# Patient Record
Sex: MALE | Race: BLACK OR AFRICAN AMERICAN | URBAN - METROPOLITAN AREA
[De-identification: names, ages, dates, MRNs, and addresses within clinical notes are randomized per-mention and may not be internally consistent; named-entity substitution may affect disease eponyms.]

---

## 2017-03-16 RX ORDER — INSULIN GLARGINE 100 [IU]/ML
INJECTION, SOLUTION SUBCUTANEOUS
Qty: 15 EACH | Refills: 0 | Status: SHIPPED | OUTPATIENT
Start: 2017-03-16 | End: 2017-08-24 | Stop reason: SDUPTHER

## 2017-08-27 RX ORDER — INSULIN GLARGINE 100 [IU]/ML
INJECTION, SOLUTION SUBCUTANEOUS
Qty: 5 PEN | Refills: 0 | Status: SHIPPED | OUTPATIENT
Start: 2017-08-27 | End: 2017-09-01 | Stop reason: SDUPTHER

## 2017-09-01 RX ORDER — INSULIN GLARGINE 100 [IU]/ML
INJECTION, SOLUTION SUBCUTANEOUS
Qty: 5 PEN | Refills: 0 | Status: SHIPPED | OUTPATIENT
Start: 2017-09-01 | End: 2018-04-02

## 2017-09-23 RX ORDER — METFORMIN HYDROCHLORIDE 1000 MG/1
TABLET ORAL
Qty: 60 TAB | Refills: 3 | Status: SHIPPED | OUTPATIENT
Start: 2017-09-23 | End: 2018-04-02 | Stop reason: SDUPTHER

## 2018-01-31 DIAGNOSIS — E11.9 TYPE 2 DIABETES MELLITUS WITHOUT COMPLICATION, WITH LONG-TERM CURRENT USE OF INSULIN (HCC): ICD-10-CM

## 2018-01-31 DIAGNOSIS — Z79.4 TYPE 2 DIABETES MELLITUS WITHOUT COMPLICATION, WITH LONG-TERM CURRENT USE OF INSULIN (HCC): ICD-10-CM

## 2018-01-31 RX ORDER — METFORMIN HYDROCHLORIDE 1000 MG/1
1000 TABLET ORAL 2 TIMES DAILY WITH MEALS
Qty: 360 TAB | Refills: 3 | Status: SHIPPED | OUTPATIENT
Start: 2018-01-31 | End: 2018-04-02 | Stop reason: SDUPTHER

## 2018-01-31 NOTE — TELEPHONE ENCOUNTER
Requested Prescriptions     Pending Prescriptions Disp Refills    metFORMIN (GLUCOPHAGE) 1,000 mg tablet 360 Tab 3     Sig: Take 1 Tab by mouth two (2) times daily (with meals). PCP: Vimal Leyva MD    Last appt: Visit date not found  No future appointments. Requested Prescriptions     Pending Prescriptions Disp Refills    metFORMIN (GLUCOPHAGE) 1,000 mg tablet 360 Tab 3     Sig: Take 1 Tab by mouth two (2) times daily (with meals).

## 2018-03-05 ENCOUNTER — TELEPHONE (OUTPATIENT)
Dept: INTERNAL MEDICINE CLINIC | Age: 43
End: 2018-03-05

## 2018-03-05 RX ORDER — TADALAFIL 5 MG/1
5 TABLET ORAL
Qty: 30 TAB | Refills: 1 | Status: SHIPPED | OUTPATIENT
Start: 2018-03-05 | End: 2018-04-02

## 2018-03-05 NOTE — TELEPHONE ENCOUNTER
Patient last seen January 2016 states he needs a call back to get an appt today for issues/concerns with his sex drive or if something can be prescribed. Patient advised may be required to be seen first. Please call.  Thank you

## 2018-03-05 NOTE — TELEPHONE ENCOUNTER
Spoke with patient after 2 patient identifiers being note and advised per Dr. Deidre Burns that Kierstenlis had been sent to the pharmacy for  . Patient expressed understanding and has no further questions at this time.

## 2018-04-01 NOTE — PROGRESS NOTES
HISTORY OF PRESENT ILLNESS  Claudio Chen is a 36 y.o. male. HPI   F/u DM-2 dyslipidemia  Last a1c 14.6 2 years ago  -Jan 2016  Today a1c is 8.8  Am fsbs 150-180 every other day  occassional < 100  Skips lantus 1-2 days per week   Can only afford one lantus pen at a time  C/o ED sxs not improved on 5 mg if cialis    Last visit:  Patient here today for followup of type 2 diabetes and dyslipidemia after long absence  Still having difficulty affording insulin and having to work 2 jobs because of a divorce  States that at least 2 days out of 7 misses his dose of levemir  Only able to afford buy 3 pens  per month because of the cost of this medication  Some symptoms of excessive urination, weight has been relatively stable, has not been compliant with diet because of his busy schedule, has been to diabetic teaching class in the past  Restraints are checked maybe 3 or 4 days a week and average on the 300 range recently  Has not seen an eye doctor in the past year, not taking aspirin, no symptoms of neuropathy in his feet, no chest pain symptoms    Patient Active Problem List    Diagnosis Date Noted    Dyslipidemia 01/18/2016    Diabetes mellitus ([facility] [address] [facility] 09/26/2011     Current Outpatient Prescriptions   Medication Sig Dispense Refill    tadalafil (CIALIS) 5 mg tablet Take 1 Tab by mouth daily as needed. 30 Tab 1    metFORMIN (GLUCOPHAGE) 1,000 mg tablet Take 1 Tab by mouth two (2) times daily (with meals). 360 Tab 3    metFORMIN (GLUCOPHAGE) 1,000 mg tablet TAKE 1 TABLET BY MOUTH TWICE A DAY WITH A MEAL 60 Tab 3    insulin glargine (LANTUS SOLOSTAR) 100 unit/mL (3 mL) inpn INJECT 27 UNITS SUBCUTANEOUSLY EVERY DAY 5 Pen 0    LANTUS SOLOSTAR 100 unit/mL (3 mL) inpn INJECT 27 UNITS SUBCUTANEOUSLY EVERY DAY 15 Units 11    FREESTYLE LANCETS 28 gauge misc TEST BEFORE MEALS AND AT BEDTIME 100 Lancet 11    Aspirin, Buffered 81 mg tab Take  by mouth.       Insulin Needles, Disposable, 31 gauge x 5/16\" ndle by SubCUTAneous route daily. Use every day with lantus pen 1 Package 11    glucose blood VI test strips (FREESTYLE LITE STRIPS) strip Test ac & hs 1 Package 11    Blood-Glucose Meter monitoring kit by Does Not Apply route. Test acbd 1 Kit 0     No Known Allergies  Social History   Substance Use Topics    Smoking status: Never Smoker    Smokeless tobacco: Never Used    Alcohol use 2.5 oz/week     5 Cans of beer per week      Lab Results  Component Value Date/Time   Glucose 306 (H) 07/01/2014 10:48 AM   Glucose  11/23/2011 10:54 AM   Microalb/Creat ratio (ug/mg creat.) 11.1 07/01/2014 10:48 AM   LDL, calculated 91 09/26/2011 11:00 AM   Creatinine 1.02 07/01/2014 10:48 AM      Lab Results  Component Value Date/Time   Cholesterol, total 197 09/26/2011 11:00 AM   Cholesterol (POC) 199 07/01/2014 04:21 PM   HDL Cholesterol 32 (L) 09/26/2011 11:00 AM   LDL, calculated 91 09/26/2011 11:00 AM   LDL Cholesterol (POC) 121 07/01/2014 04:21 PM   Triglyceride 368 (H) 09/26/2011 11:00 AM   Triglycerides (POC) 171 07/01/2014 04:21 PM     Lab Results  Component Value Date/Time   GFR est non-AA 92 07/01/2014 10:48 AM   GFR est  07/01/2014 10:48 AM   Creatinine 1.02 07/01/2014 10:48 AM   BUN 11 07/01/2014 10:48 AM   Sodium 137 07/01/2014 10:48 AM   Potassium 4.2 07/01/2014 10:48 AM   Chloride 98 07/01/2014 10:48 AM   CO2 23 07/01/2014 10:48 AM        ROS    Physical Exam   Constitutional: He appears well-developed and well-nourished. No distress. Appears stated age   HENT:   Head: Normocephalic. Cardiovascular: Normal rate, regular rhythm and normal heart sounds. Exam reveals no gallop and no friction rub. No murmur heard. Pulmonary/Chest: Effort normal and breath sounds normal. No respiratory distress. He has no wheezes. He has no rales. He exhibits no tenderness. Abdominal: Soft. Musculoskeletal: He exhibits no edema. Neurological: He is alert. Psychiatric: He has a normal mood and affect.    Nursing note and vitals reviewed. ASSESSMENT and PLAN  Diagnoses and all orders for this visit:    1. Uncontrolled type 2 diabetes mellitus without complication, with long-term current use of insulin (HCC)  -     METABOLIC PANEL, COMPREHENSIVE  -     MICROALBUMIN, UR, RAND W/ MICROALB/CREAT RATIO  -     AMB POC HEMOGLOBIN A1C 8.8  -     REFERRAL TO OPHTHALMOLOGY   Change Lantus to vial/syringes to lower the cost   appt today with CDE in office for diet and insulin instruction    2. Dyslipidemia  -     METABOLIC PANEL, COMPREHENSIVE  -     LIPID PANEL   Probably needs statin    3. Elevated systolic blood pressure reading without diagnosis of hypertension   low sodium diet   Recheck 3 mos   May need to start medication  4. Erectile dysfunction, unspecified erectile dysfunction type   cialis 20 mg tab  Other orders  -     metFORMIN (GLUCOPHAGE) 1,000 mg tablet; TAKE 1 TABLET BY MOUTH TWICE A DAY WITH A MEAL  -     insulin glargine (LANTUS) 100 unit/mL injection; 27 units sc qd  -     Insulin Syringes, Disposable, 1 mL syrg; 1 Each by Does Not Apply route daily. -     tadalafil (CIALIS) 20 mg tablet; Take 1 Tab by mouth as needed. Follow-up Disposition:  Return in about 3 months (around 7/2/2018) for dm-2 elevated bp .

## 2018-04-02 ENCOUNTER — PATIENT OUTREACH (OUTPATIENT)
Dept: INTERNAL MEDICINE CLINIC | Age: 43
End: 2018-04-02

## 2018-04-02 ENCOUNTER — TELEPHONE (OUTPATIENT)
Dept: INTERNAL MEDICINE CLINIC | Age: 43
End: 2018-04-02

## 2018-04-02 ENCOUNTER — OFFICE VISIT (OUTPATIENT)
Dept: INTERNAL MEDICINE CLINIC | Age: 43
End: 2018-04-02

## 2018-04-02 VITALS
SYSTOLIC BLOOD PRESSURE: 141 MMHG | OXYGEN SATURATION: 100 % | HEART RATE: 83 BPM | DIASTOLIC BLOOD PRESSURE: 89 MMHG | HEIGHT: 73 IN | TEMPERATURE: 98.2 F | BODY MASS INDEX: 30.35 KG/M2 | WEIGHT: 229 LBS

## 2018-04-02 DIAGNOSIS — N52.9 ERECTILE DYSFUNCTION, UNSPECIFIED ERECTILE DYSFUNCTION TYPE: ICD-10-CM

## 2018-04-02 DIAGNOSIS — R03.0 ELEVATED SYSTOLIC BLOOD PRESSURE READING WITHOUT DIAGNOSIS OF HYPERTENSION: ICD-10-CM

## 2018-04-02 DIAGNOSIS — E78.5 DYSLIPIDEMIA: ICD-10-CM

## 2018-04-02 LAB — HBA1C MFR BLD HPLC: 8.8 % (ref 4.8–5.6)

## 2018-04-02 RX ORDER — SYRINGE-NEEDLE,INSULIN,0.5 ML 30 GX5/16"
1 SYRINGE, EMPTY DISPOSABLE MISCELLANEOUS DAILY
Qty: 100 SYRINGE | Refills: 3 | Status: SHIPPED | OUTPATIENT
Start: 2018-04-02

## 2018-04-02 RX ORDER — TADALAFIL 20 MG/1
20 TABLET ORAL AS NEEDED
Qty: 6 TAB | Refills: 6 | Status: SHIPPED | OUTPATIENT
Start: 2018-04-02 | End: 2018-11-26 | Stop reason: SDUPTHER

## 2018-04-02 RX ORDER — PEN NEEDLE, DIABETIC 30 GX3/16"
NEEDLE, DISPOSABLE MISCELLANEOUS DAILY
Qty: 1 PACKAGE | Refills: 11 | Status: SHIPPED | OUTPATIENT
Start: 2018-04-02 | End: 2018-04-11 | Stop reason: SDUPTHER

## 2018-04-02 RX ORDER — METFORMIN HYDROCHLORIDE 1000 MG/1
TABLET ORAL
Qty: 60 TAB | Refills: 11 | Status: SHIPPED | OUTPATIENT
Start: 2018-04-02 | End: 2018-05-08 | Stop reason: SDUPTHER

## 2018-04-02 RX ORDER — INSULIN GLARGINE 100 [IU]/ML
INJECTION, SOLUTION SUBCUTANEOUS
Qty: 3 VIAL | Refills: 6 | Status: SHIPPED | OUTPATIENT
Start: 2018-04-02 | End: 2019-06-24 | Stop reason: SDUPTHER

## 2018-04-02 NOTE — MR AVS SNAPSHOT
[address] [facility] [address] [place] [address] 
[phone] Patient: Lorie Duarte MRN: [id number] Visit Information Date & Time Provider Department Dept. Phone Encounter #  
 4/2/2018  9:00 AM Cindi Luis, [address] [place] [address],[address] [phone] [id number] Follow-up Instructions Return in about 3 months (around 7/2/2018) for dm-2 elevated bp . Upcoming Health Maintenance Date Due  
 EYE EXAM RETINAL OR DILATED Q1 10/5/1984 DTaP/Tdap/Td series (1 - Tdap) 10/5/1995 FOOT EXAM Q1 7/1/2015 MICROALBUMIN Q1 7/1/2015 LIPID PANEL Q1 7/1/2015 HEMOGLOBIN A1C Q6M 7/19/2016 Allergies as of 4/2/2018  Review Complete On: 4/2/2018 By: Curtis Nichols LPN No Known Allergies Current Immunizations  Never Reviewed No immunizations on file. Not reviewed this visit You Were Diagnosed With   
  
 Codes Comments Uncontrolled type 2 diabetes mellitus without complication, with long-term current use of insulin [facility]    -  Primary ICD-10-CM: E11.65, Z79.4 ICD-9-CM: 250.02, V58.67 Dyslipidemia     ICD-10-CM: E78.5 ICD-9-CM: 272.4 Elevated systolic blood pressure reading without diagnosis of hypertension     ICD-10-CM: R03.0 ICD-9-CM: 796.2 Erectile dysfunction, unspecified erectile dysfunction type     ICD-10-CM: N52.9 ICD-9-CM: 607.84 Vitals BP Pulse Temp Height(growth percentile) Weight(growth percentile) SpO2  
 141/89 (BP 1 Location: Left arm, BP Patient Position: Sitting) 83 98.2 °F (36.8 °C) (Oral) 6' 1\" (1.854 m) 229 lb (103.9 kg) 100% BMI Smoking Status 30.21 kg/m2 Never Smoker BMI and BSA Data Body Mass Index Body Surface Area  
 30.21 kg/m 2 2.31 m 2 Preferred Pharmacy Pharmacy Name Phone [id number] [place], [address] [phone] Your Updated Medication List  
  
   
This list is accurate as of 18  9:59 AM.  Always use your most recent med list.  
  
  
  
  
 aspirin, buffered 81 mg Tab Take  by mouth. Blood-Glucose Meter monitoring kit  
by Does Not Apply route. Test acbd FREESTYLE LANCETS 28 gauge Misc Generic drug:  lancets TEST BEFORE MEALS AND AT BEDTIME  
  
 glucose blood VI test strips strip Commonly known as:  FREESTYLE LITE STRIPS Test ac & hs  
  
 insulin glargine 100 unit/mL injection Commonly known as:  LANTUS  
27 units sc qd Insulin Needles (Disposable) 31 gauge x 5/16\" Ndle  
by SubCUTAneous route daily. Use every day with lantus pen Insulin Syringes (Disposable) 1 mL Syrg 1 Each by Does Not Apply route daily. metFORMIN 1,000 mg tablet Commonly known as:  GLUCOPHAGE  
TAKE 1 TABLET BY MOUTH TWICE A DAY WITH A MEAL  
  
 tadalafil 20 mg tablet Commonly known as:  CIALIS Take 1 Tab by mouth as needed. Prescriptions Sent to Pharmacy Refills  
 metFORMIN (GLUCOPHAGE) 1,000 mg tablet 11 Sig: TAKE 1 TABLET BY MOUTH TWICE A DAY WITH A MEAL Class: Normal  
 Pharmacy: [facility] [place] [address], [address] Ph #: [phone]  
 insulin glargine (LANTUS) 100 unit/mL injection 6 Si units sc qd Class: Normal  
 Pharmacy: [address] [place] [address], [address] Ph #: [phone] Insulin Syringes, Disposable, 1 mL syrg 6 Si Each by Does Not Apply route daily. Class: Normal  
 Pharmacy: [address] [place] [address], [address] Ph #: [phone] Route: Does Not Apply  
 tadalafil (CIALIS) 20 mg tablet 6 Sig: Take 1 Tab by mouth as needed.   
 Class: Normal  
 Pharmacy: [address] [place], [place] [address]Latisha Abdullahi [address]  #: [phone] Route: Oral  
  
We Performed the Following AMB POC HEMOGLOBIN A1C [90325 CPT(R)] LIPID PANEL [44742 CPT(R)] METABOLIC PANEL, COMPREHENSIVE [03967 CPT(R)] MICROALBUMIN, UR, RAND W/ MICROALB/CREAT RATIO A059779 CPT(R)] REFERRAL TO OPHTHALMOLOGY [REF57 Custom] Follow-up Instructions Return in about 3 months (around 7/2/2018) for dm-2 elevated bp . Referral Information Referral ID Referred By Referred To  
  
 [id number] Landon BESS [address] [place] [address] [address] [place], [id number] [place] [address] Visits Status Start Date End Date 1 New Request 4/2/18 4/2/19 If your referral has a status of pending review or denied, additional information will be sent to support the outcome of this decision. Introducing [facility]! [facility] introduces [employer] patient portal. Now you can access parts of your medical record, email your doctor's office, and request medication refills online. 1. In your internet browser, go to [email or web address] [email or web address] 2. Click on the First Time User? Click Here link in the Sign In box. You will see the New Member Sign Up page. 3. Enter your [employer] Access Code exactly as it appears below. You will not need to use this code after youve completed the sign-up process. If you do not sign up before the expiration date, you must request a new code. · [employer] Access Code: [email or web address] Expires: 7/1/2018  9:59 AM 
 
4. Enter the last four digits of your Social Security Number (xxxx) and Date of Birth (mm/dd/yyyy) as indicated and click Submit. You will be taken to the next sign-up page. 5. Create a [employer] ID. This will be your [employer] login ID and cannot be changed, so think of one that is secure and easy to remember. 6. Create a [employer] password. You can change your password at any time. 7. Enter your Password Reset Question and Answer. This can be used at a later time if you forget your password. 8. Enter your e-mail address. You will receive e-mail notification when new information is available in [address] 9. Click Sign Up. You can now view and download portions of your medical record. 10. Click the Download Summary menu link to download a portable copy of your medical information. If you have questions, please visit the Frequently Asked Questions section of the [employer] website. Remember, [employer] is NOT to be used for urgent needs. For medical emergencies, dial 911. Now available from your iPhone and Android! Please provide this summary of care documentation to your next provider. Your primary care clinician is listed as Kate BESS. If you have any questions after today's visit, please call [phone].

## 2018-04-02 NOTE — PROGRESS NOTES
Was asked by Dr. Daria Greenfield to see pt today during office visit for Lantus vial and syringe administration instructions and diet. Pt has been taking insulin via the Lantus pen. Pt has trouble affording Lantus pen. Pt's A1c was 8.8% in office today. Pt has taken his insulin this morning. Pt was instructed how to inject insulin using a vial and syringe. Pt correctly return demonstrated using the injection pad model. Pt was given 10 sample B-D 1/2 mL (50 unit) syringes. Pt was given the Lantus vial and syringe step by step instructions to take home. Pt will call with any questions or concerns. Pt attended diabetes education when first diagnosed about 2 years ago. Pt has pretty good understanding of what a carbohydrate is, but does drink sugar sweetened tea. Pt states he does have some occasional numbness and tingling in his feet. Pt was educated on the healthy plate meal planning method. Gave pt the American Diabetes Association - Living With Type 2 Diabetes - Where Do I Begin? handout. Pt was instructed to schedule 3 month f/u with  today before he leaves. Pt will call this diabetes educator with any questions or concerns.      Juan M Sawyer, RN, CCM, CDE

## 2018-04-02 NOTE — TELEPHONE ENCOUNTER
Spoke with patient after 2 patient identifiers being note and advised that pharmacy reports that inulin and Cialis are there and ready for . Patient expressed understanding and has no further questions at this time.

## 2018-04-02 NOTE — TELEPHONE ENCOUNTER
Patient states [facility] on file needs a call back to get clarification on patient's Lantus & Cialis prescriptions. Please call pharmacy & then patient.  Thank you

## 2018-04-02 NOTE — PROGRESS NOTES
Chief Complaint   Patient presents with    Diabetes     POC A1C 8.8%    Hypertension    Medication Evaluation

## 2018-04-03 LAB
ALBUMIN SERPL-MCNC: 4.6 G/DL (ref 3.5–5.5)
ALBUMIN/CREAT UR: 19.1 MG/G CREAT (ref 0–30)
ALBUMIN/GLOB SERPL: 1.7 {RATIO} (ref 1.2–2.2)
ALP SERPL-CCNC: 91 IU/L (ref 39–117)
ALT SERPL-CCNC: 23 IU/L (ref 0–44)
AST SERPL-CCNC: 19 IU/L (ref 0–40)
BILIRUB SERPL-MCNC: 0.5 MG/DL (ref 0–1.2)
BUN SERPL-MCNC: 11 MG/DL (ref 6–24)
BUN/CREAT SERPL: 12 (ref 9–20)
CALCIUM SERPL-MCNC: 9.5 MG/DL (ref 8.7–10.2)
CHLORIDE SERPL-SCNC: 106 MMOL/L (ref 96–106)
CHOLEST SERPL-MCNC: 164 MG/DL (ref 100–199)
CO2 SERPL-SCNC: 18 MMOL/L (ref 18–29)
CREAT SERPL-MCNC: 0.93 MG/DL (ref 0.76–1.27)
CREAT UR-MCNC: 221.6 MG/DL
GFR SERPLBLD CREATININE-BSD FMLA CKD-EPI: 100 ML/MIN/1.73
GFR SERPLBLD CREATININE-BSD FMLA CKD-EPI: 116 ML/MIN/1.73
GLOBULIN SER CALC-MCNC: 2.7 G/DL (ref 1.5–4.5)
GLUCOSE SERPL-MCNC: 222 MG/DL (ref 65–99)
HDLC SERPL-MCNC: 61 MG/DL
LDLC SERPL CALC-MCNC: 89 MG/DL (ref 0–99)
MICROALBUMIN UR-MCNC: 42.4 UG/ML
POTASSIUM SERPL-SCNC: 4 MMOL/L (ref 3.5–5.2)
PROT SERPL-MCNC: 7.3 G/DL (ref 6–8.5)
SODIUM SERPL-SCNC: 147 MMOL/L (ref 134–144)
TRIGL SERPL-MCNC: 71 MG/DL (ref 0–149)
VLDLC SERPL CALC-MCNC: 14 MG/DL (ref 5–40)

## 2018-04-06 ENCOUNTER — TELEPHONE (OUTPATIENT)
Dept: INTERNAL MEDICINE CLINIC | Age: 43
End: 2018-04-06

## 2018-04-06 NOTE — TELEPHONE ENCOUNTER
#[phone] pt states the medication that had dosage change needs to have a prior auth through insurance co. This is the Cialis. Please call them.

## 2018-04-09 ENCOUNTER — TELEPHONE (OUTPATIENT)
Dept: INTERNAL MEDICINE CLINIC | Age: 43
End: 2018-04-09

## 2018-04-09 NOTE — TELEPHONE ENCOUNTER
The pt called because his pharmacy hasn't received a response for his \"cialis\" medication yet.        Best contact number is [phone].          Message received & copied from [facility] after closing on 4/6/18

## 2018-04-09 NOTE — TELEPHONE ENCOUNTER
#[phone] [facility] states that the PA has gone through, but they are still requesting the approval.  Please fax this to #[phone] thanks.

## 2018-04-09 NOTE — TELEPHONE ENCOUNTER
Per [employer] PA was already submitted for this patient and drug which was approved. ;PWMGAC:[id number];JNZABS:[id number]; Coverage Start [id number]; Coverage End [id number]. Patient & pharmacy notified.

## 2018-04-10 NOTE — TELEPHONE ENCOUNTER
Pt is requesting  to contact the pharmacy, because they need the approval the the prescription. Best contact number is [phone].        Message received & copied from [facility] after closing on 4/9/18

## 2018-04-10 NOTE — TELEPHONE ENCOUNTER
#[phone] pt states that insurance states his script has been approved and yet he can't get this filled. Pt wants to know why? This is for the Cialis. Please call pt so he knows what is going on.

## 2018-04-11 DIAGNOSIS — E11.9 TYPE 2 DIABETES MELLITUS WITHOUT COMPLICATION, WITH LONG-TERM CURRENT USE OF INSULIN (HCC): Primary | ICD-10-CM

## 2018-04-11 DIAGNOSIS — Z79.4 TYPE 2 DIABETES MELLITUS WITHOUT COMPLICATION, WITH LONG-TERM CURRENT USE OF INSULIN (HCC): Primary | ICD-10-CM

## 2018-04-11 RX ORDER — PEN NEEDLE, DIABETIC 30 GX3/16"
NEEDLE, DISPOSABLE MISCELLANEOUS DAILY
Qty: 3 PACKAGE | Refills: 3 | Status: SHIPPED | OUTPATIENT
Start: 2018-04-11 | End: 2019-08-09 | Stop reason: SDUPTHER

## 2018-04-11 NOTE — TELEPHONE ENCOUNTER
Requested Prescriptions     Pending Prescriptions Disp Refills    Insulin Needles, Disposable, 31 gauge x 5/16\" ndle 3 Package 3     Sig: by SubCUTAneous route daily. Use every day with lantus pen      PCP: Sloane Coffman MD    Last appt: 4/2/2018  No future appointments. Requested Prescriptions     Pending Prescriptions Disp Refills    Insulin Needles, Disposable, 31 gauge x 5/16\" ndle 3 Package 3     Sig: by SubCUTAneous route daily.  Use every day with lantus pen

## 2018-04-12 ENCOUNTER — TELEPHONE (OUTPATIENT)
Dept: INTERNAL MEDICINE CLINIC | Age: 43
End: 2018-04-12

## 2018-04-12 NOTE — TELEPHONE ENCOUNTER
Pt is requesting a call to the insurance company for prior authorization on the \"Cialis 20 mg.\" Pt contact [phone].        Message received & copied from [facility]

## 2018-04-23 RX ORDER — INSULIN GLARGINE 100 [IU]/ML
INJECTION, SOLUTION SUBCUTANEOUS
Qty: 15 ADJUSTABLE DOSE PRE-FILLED PEN SYRINGE | Refills: 6 | Status: SHIPPED | OUTPATIENT
Start: 2018-04-23 | End: 2018-05-08 | Stop reason: SDUPTHER

## 2018-05-08 ENCOUNTER — TELEPHONE (OUTPATIENT)
Dept: INTERNAL MEDICINE CLINIC | Age: 43
End: 2018-05-08

## 2018-05-08 DIAGNOSIS — E11.9 TYPE 2 DIABETES MELLITUS WITHOUT COMPLICATION, WITH LONG-TERM CURRENT USE OF INSULIN (HCC): Primary | ICD-10-CM

## 2018-05-08 DIAGNOSIS — Z79.4 TYPE 2 DIABETES MELLITUS WITHOUT COMPLICATION, WITH LONG-TERM CURRENT USE OF INSULIN (HCC): Primary | ICD-10-CM

## 2018-05-08 DIAGNOSIS — E11.9 TYPE 2 DIABETES MELLITUS WITHOUT COMPLICATION, WITH LONG-TERM CURRENT USE OF INSULIN (HCC): ICD-10-CM

## 2018-05-08 DIAGNOSIS — Z79.4 TYPE 2 DIABETES MELLITUS WITHOUT COMPLICATION, WITH LONG-TERM CURRENT USE OF INSULIN (HCC): ICD-10-CM

## 2018-05-08 RX ORDER — SYRINGE-NEEDLE,INSULIN,0.5 ML 30 GX5/16"
1 SYRINGE, EMPTY DISPOSABLE MISCELLANEOUS DAILY
Qty: 100 SYRINGE | Refills: 3 | Status: CANCELLED | OUTPATIENT
Start: 2018-05-08

## 2018-05-08 RX ORDER — METFORMIN HYDROCHLORIDE 1000 MG/1
TABLET ORAL
Qty: 180 TAB | Refills: 3 | Status: SHIPPED | OUTPATIENT
Start: 2018-05-08 | End: 2019-06-24 | Stop reason: SDUPTHER

## 2018-05-08 RX ORDER — INSULIN GLARGINE 100 [IU]/ML
INJECTION, SOLUTION SUBCUTANEOUS
Qty: 15 ADJUSTABLE DOSE PRE-FILLED PEN SYRINGE | Refills: 6 | Status: SHIPPED | OUTPATIENT
Start: 2018-05-08 | End: 2019-06-24 | Stop reason: SDUPTHER

## 2018-05-08 NOTE — TELEPHONE ENCOUNTER
Requested Prescriptions     Pending Prescriptions Disp Refills    metFORMIN (GLUCOPHAGE) 1,000 mg tablet 180 Tab 3     Sig: TAKE 1 TABLET BY MOUTH TWICE A DAY WITH A MEAL       PCP: Tyrese Rodriguez MD    Last appt: 4/2/2018  No future appointments.     Requested Prescriptions     Pending Prescriptions Disp Refills    metFORMIN (GLUCOPHAGE) 1,000 mg tablet 180 Tab 3     Sig: TAKE 1 TABLET BY MOUTH TWICE A DAY WITH A MEAL

## 2018-05-08 NOTE — TELEPHONE ENCOUNTER
Requested Prescriptions     Pending Prescriptions Disp Refills    insulin glargine (LANTUS SOLOSTAR U-100 INSULIN) 100 unit/mL (3 mL) inpn 15 Adjustable Dose Pre-filled Pen Syringe 6

## 2018-11-23 ENCOUNTER — TELEPHONE (OUTPATIENT)
Dept: INTERNAL MEDICINE CLINIC | Age: 43
End: 2018-11-23

## 2018-11-23 NOTE — TELEPHONE ENCOUNTER
Unable to reach patient LVM to return call.  Want to schedule patient an appt to get lantus insulin and symbicort looked at per Dr. Villeda Bud

## 2018-11-26 DIAGNOSIS — N52.8 OTHER MALE ERECTILE DYSFUNCTION: Primary | ICD-10-CM

## 2018-11-26 RX ORDER — TADALAFIL 10 MG/1
10 TABLET ORAL AS NEEDED
Qty: 30 TAB | Refills: 1 | Status: SHIPPED | OUTPATIENT
Start: 2018-11-26 | End: 2019-12-30

## 2018-11-26 NOTE — TELEPHONE ENCOUNTER
Pt requesting a refill for \"Cialas 5 mg\". He stated he would like 10 mg instead if he could get that because 5 mg did not really work. [facility] pharmacy on file. Best contact [phone].       Copy/paste Envera

## 2018-12-14 ENCOUNTER — TELEPHONE (OUTPATIENT)
Dept: INTERNAL MEDICINE CLINIC | Age: 43
End: 2018-12-14

## 2018-12-14 NOTE — TELEPHONE ENCOUNTER
Pt requesting an authorization to be done on prescription: \"cialis 10 mg\". [place] [phone]. Pt best contact number is [phone].        Message received & copied from [facility]

## 2019-05-10 RX ORDER — INSULIN GLARGINE 100 [IU]/ML
INJECTION, SOLUTION SUBCUTANEOUS
Qty: 15 ADJUSTABLE DOSE PRE-FILLED PEN SYRINGE | Refills: 3 | Status: SHIPPED | OUTPATIENT
Start: 2019-05-10 | End: 2019-06-24 | Stop reason: SDUPTHER

## 2019-05-22 ENCOUNTER — TELEPHONE (OUTPATIENT)
Dept: INTERNAL MEDICINE CLINIC | Age: 44
End: 2019-05-22

## 2019-05-22 NOTE — TELEPHONE ENCOUNTER
Spoke with patient using two identifiers name and date of birth. Patient was informed that he needed to make an appointment before his next medication  refills. Patient is good for now on his medications. Patient will call back to make  an appointment. Patient verbalized understanding.

## 2019-05-23 ENCOUNTER — TELEPHONE (OUTPATIENT)
Dept: INTERNAL MEDICINE CLINIC | Age: 44
End: 2019-05-23

## 2019-05-23 NOTE — TELEPHONE ENCOUNTER
Called, left vm for pt to return call to office. Notified pt via voicemail, insulin sent to pharmacy on 5/10.

## 2019-05-23 NOTE — TELEPHONE ENCOUNTER
----- Message from Sherren Peal sent at 5/23/2019  9:10 AM EDT -----  Regarding: Dr. Allie Ritter  Patient scheduled an appointment with Dr. Aris Moffett for 6/17/19 at 1:30pm to renew prescription for insulin. Patient is requesting that a temporary approval be given so he will not run out prior to his upcoming appointment. Pharmacy is [facility] on [address] [address]. Best contact number for patient is [phone].

## 2019-06-24 ENCOUNTER — OFFICE VISIT (OUTPATIENT)
Dept: INTERNAL MEDICINE CLINIC | Age: 44
End: 2019-06-24

## 2019-06-24 VITALS
RESPIRATION RATE: 20 BRPM | SYSTOLIC BLOOD PRESSURE: 133 MMHG | TEMPERATURE: 98.2 F | HEIGHT: 73 IN | HEART RATE: 93 BPM | OXYGEN SATURATION: 98 % | BODY MASS INDEX: 32.13 KG/M2 | DIASTOLIC BLOOD PRESSURE: 83 MMHG | WEIGHT: 242.4 LBS

## 2019-06-24 DIAGNOSIS — E11.9 TYPE 2 DIABETES MELLITUS WITHOUT COMPLICATION, WITH LONG-TERM CURRENT USE OF INSULIN (HCC): ICD-10-CM

## 2019-06-24 DIAGNOSIS — Z79.4 TYPE 2 DIABETES MELLITUS WITHOUT COMPLICATION, WITH LONG-TERM CURRENT USE OF INSULIN (HCC): ICD-10-CM

## 2019-06-24 RX ORDER — METFORMIN HYDROCHLORIDE 1000 MG/1
TABLET ORAL
Qty: 180 TAB | Refills: 3 | Status: SHIPPED | OUTPATIENT
Start: 2019-06-24 | End: 2020-04-06 | Stop reason: CLARIF

## 2019-06-24 RX ORDER — INSULIN PUMP SYRINGE, 3 ML
EACH MISCELLANEOUS
Qty: 1 KIT | Refills: 0 | Status: SHIPPED | OUTPATIENT
Start: 2019-06-24

## 2019-06-24 RX ORDER — LANCETS 28 GAUGE
EACH MISCELLANEOUS
Qty: 100 LANCET | Refills: 11 | Status: SHIPPED | OUTPATIENT
Start: 2019-06-24

## 2019-06-24 RX ORDER — INSULIN GLARGINE 100 [IU]/ML
40 INJECTION, SOLUTION SUBCUTANEOUS
Qty: 3 VIAL | Refills: 6 | Status: SHIPPED | OUTPATIENT
Start: 2019-06-24 | End: 2020-03-26 | Stop reason: SDUPTHER

## 2019-06-24 NOTE — PROGRESS NOTES
1. Have you been to the ER, urgent care clinic since your last visit? Hospitalized since your last visit? no    2. Have you seen or consulted any other health care providers outside of the [address] [place] [address] since your last visit? Include any pap smears or colon screening.  no

## 2019-06-24 NOTE — PROGRESS NOTES
SUBJECTIVE:   Mr. Juliet Mora is a 39 y.o. male patient of Katrin Jones MD who is here for follow up of routine medical issues. Chief Complaint   Patient presents with    Medication Refill     pt here today for medication refill and disucuss diabetes      He has been diabetic for about 5 years. He is on lantus and metformin. Fasting gluc 120-130; in the evening low 100s. Occasional tingling in feet. At this time, he is otherwise doing well and has brought no other complaints to my attention today. For a list of the medical issues addressed today, see the assessment and plan below. PMH: Diabetes mellitus. PSH: No surgery. All: He has No Known Allergies. Current Outpatient Medications   Medication Sig    LANTUS SOLOSTAR U-100 INSULIN 100 unit/mL (3 mL) inpn INJECT 27 UNITS SUBCUTANEOUSLY EVERY DAY    tadalafil (CIALIS) 10 mg tablet Take 1 Tab by mouth as needed.  metFORMIN (GLUCOPHAGE) 1,000 mg tablet TAKE 1 TABLET BY MOUTH TWICE A DAY WITH A MEAL    insulin glargine (LANTUS SOLOSTAR U-100 INSULIN) 100 unit/mL (3 mL) inpn INJECT 27 UNITS SUBCUTANEOUSLY EVERY DAY    Insulin Needles, Disposable, 31 gauge x 5/16\" ndle by SubCUTAneous route daily. Use every day with lantus pen    insulin glargine (LANTUS) 100 unit/mL injection 27 units sc qd    Insulin Syringes, Disposable, 1 mL syrg 1 Each by Does Not Apply route daily.  Insulin Syringe-Needle U-100 (INSULIN SYRINGE) 1 mL 30 gauge x 5/16 syrg 1 Syringe by Does Not Apply route daily.  FREESTYLE LANCETS 28 gauge misc TEST BEFORE MEALS AND AT BEDTIME    glucose blood VI test strips (FREESTYLE LITE STRIPS) strip Test ac & hs    Blood-Glucose Meter monitoring kit by Does Not Apply route. Test acbd    Aspirin, Buffered 81 mg tab Take  by mouth. No current facility-administered medications for this visit. FH: His family history is not on file. : Luann Glynn. He reports that he has never smoked.  He has never used smokeless tobacco. He reports that he drinks about 2.5 oz of alcohol per week. He reports that he has current or past drug history. ROS: See above; Complete ROS otherwise negative. OBJECTIVE:   Vitals:   Visit Vitals  /83 (BP 1 Location: Left arm, BP Patient Position: Sitting)   Pulse 93   Temp 98.2 °F (36.8 °C) (Oral)   Resp 20   Ht 6' 1\" (1.854 m)   Wt 242 lb 6.4 oz (110 kg)   SpO2 98%   BMI 31.98 kg/m²      Gen: Pleasant 39 y.o.  male in NAD. HEENT: PERRLA. EOMI. OP moist and pink. Neck: Supple. No LAD. HEART: RRR, No M/G/R.    LUNGS: CTAB No W/R. ABDOMEN: S, NT, ND, BS+. EXTREMITIES: Warm. No C/C/E.  MUSCULOSKELETAL: Normal ROM, muscle strength 5/5 all groups. NEURO: Alert and oriented x 3. Cranial nerves grossly intact. No focal sensory or motor deficits noted. SKIN: Warm. Dry. No rashes or other lesions noted. Lab Results   Component Value Date/Time    Sodium 147 (H) 04/02/2018 10:41 AM    Potassium 4.0 04/02/2018 10:41 AM    Chloride 106 04/02/2018 10:41 AM    CO2 18 04/02/2018 10:41 AM    Glucose 222 (H) 04/02/2018 10:41 AM    BUN 11 04/02/2018 10:41 AM    Creatinine 0.93 04/02/2018 10:41 AM    BUN/Creatinine ratio 12 04/02/2018 10:41 AM    GFR est  04/02/2018 10:41 AM    GFR est non- 04/02/2018 10:41 AM    Calcium 9.5 04/02/2018 10:41 AM    Bilirubin, total 0.5 04/02/2018 10:41 AM    ALT (SGPT) 23 04/02/2018 10:41 AM    AST (SGOT) 19 04/02/2018 10:41 AM    Alk.  phosphatase 91 04/02/2018 10:41 AM    Protein, total 7.3 04/02/2018 10:41 AM    Albumin 4.6 04/02/2018 10:41 AM    A-G Ratio 1.7 04/02/2018 10:41 AM       Lab Results   Component Value Date/Time    Cholesterol, total 164 04/02/2018 10:41 AM    HDL Cholesterol 61 04/02/2018 10:41 AM    LDL, calculated 89 04/02/2018 10:41 AM    Triglyceride 71 04/02/2018 10:41 AM       Lab Results   Component Value Date/Time    WBC 5.1 07/01/2014 10:48 AM    HGB 16.0 07/01/2014 10:48 AM    HCT 47.4 07/01/2014 10:48 AM PLATELET 196 08/66/6249 10:48 AM    MCV 88 07/01/2014 10:48 AM         ASSESSMENT/ PLAN: Diagnoses and all orders for this visit:    1. Type 2 diabetes mellitus without complication, with long-term current use of insulin [facility]): Not controlled at last check. -     insulin glargine (LANTUS) 100 unit/mL injection; 40 Units by SubCUTAneous route nightly. -     metFORMIN (GLUCOPHAGE) 1,000 mg tablet; TAKE 1 TABLET BY MOUTH TWICE A DAY WITH A MEAL  -     Blood-Glucose Meter monitoring kit; Test before meals and at bedtime. DM 2, insulin dependent. Length of need: 99.  -     glucose blood VI test strips (FREESTYLE LITE STRIPS) strip; Test ac & hs  -     lancets (FREESTYLE LANCETS) 28 gauge misc; Test before meals and at bedtime  -     LIPID PANEL  -     HEMOGLOBIN A1C WITH EAG  -     METABOLIC PANEL, COMPREHENSIVE  -     CBC WITH AUTOMATED DIFF  -     MICROALBUMIN, UR, RAND W/ MICROALB/CREAT RATIO    Follow-up and Dispositions    · Return in about 3 months (around 9/24/2019) for DM; Dr. De Leon Pel. I have reviewed the patient's medications and risks/side effects/benefits were discussed. Diagnosis(-es) explained to patient and questions answered. Literature provided where appropriate.

## 2019-08-09 DIAGNOSIS — Z79.4 TYPE 2 DIABETES MELLITUS WITHOUT COMPLICATION, WITH LONG-TERM CURRENT USE OF INSULIN (HCC): ICD-10-CM

## 2019-08-09 DIAGNOSIS — E11.9 TYPE 2 DIABETES MELLITUS WITHOUT COMPLICATION, WITH LONG-TERM CURRENT USE OF INSULIN (HCC): ICD-10-CM

## 2019-08-09 RX ORDER — PEN NEEDLE, DIABETIC 31 GX5/16"
NEEDLE, DISPOSABLE MISCELLANEOUS
Qty: 90 PEN NEEDLE | Refills: 13 | Status: SHIPPED | OUTPATIENT
Start: 2019-08-09 | End: 2021-05-06

## 2019-12-30 DIAGNOSIS — N52.8 OTHER MALE ERECTILE DYSFUNCTION: ICD-10-CM

## 2019-12-30 RX ORDER — TADALAFIL 10 MG/1
TABLET ORAL
Qty: 8 TAB | Refills: 7 | Status: SHIPPED | OUTPATIENT
Start: 2019-12-30 | End: 2020-01-07 | Stop reason: SDUPTHER

## 2020-01-07 DIAGNOSIS — N52.8 OTHER MALE ERECTILE DYSFUNCTION: ICD-10-CM

## 2020-01-07 RX ORDER — TADALAFIL 5 MG/1
TABLET ORAL
Qty: 9 TAB | Refills: 3 | Status: SHIPPED | OUTPATIENT
Start: 2020-01-07 | End: 2021-03-15

## 2020-01-07 NOTE — TELEPHONE ENCOUNTER
Pt states he gets 5 mg of this medication. This needs to go to [facility] for 90 day mail order. Phone #[phone] To call that in.

## 2020-03-26 DIAGNOSIS — Z79.4 TYPE 2 DIABETES MELLITUS WITHOUT COMPLICATION, WITH LONG-TERM CURRENT USE OF INSULIN (HCC): ICD-10-CM

## 2020-03-26 DIAGNOSIS — E11.9 TYPE 2 DIABETES MELLITUS WITHOUT COMPLICATION, WITH LONG-TERM CURRENT USE OF INSULIN (HCC): ICD-10-CM

## 2020-03-26 RX ORDER — INSULIN GLARGINE 100 [IU]/ML
40 INJECTION, SOLUTION SUBCUTANEOUS
Qty: 3 VIAL | Refills: 6 | Status: SHIPPED | OUTPATIENT
Start: 2020-03-26 | End: 2020-04-06 | Stop reason: SDUPTHER

## 2020-03-26 NOTE — TELEPHONE ENCOUNTER
----- Message from Rachel James sent at 3/26/2020  9:20 AM EDT -----  Regarding: /refill  MEDICATION REFILL    To: Gladys Tirado  Subject: Dr. Essie Warren  Patient's first and last name: Anand Ndiaye. : 1975  ID numbers: #[id number]      Caller (if not patient): n/a  Relationship of caller (if not patient): n/a  Best contact number(s): [phone]  Name of medication and dosage if known: \"Lantus Solostar\" insulin pen  Is patient out of this medication (yes/no): yes  Pharmacy name: [facility]  Pharmacy listed in chart? (yes/no): yes  Pharmacy phone number: [phone]  Date of last visit: 2019  Details to clarify the request: Patient has requested a 90 day supply.     [place]      Copy/paste envera

## 2020-04-01 ENCOUNTER — TELEPHONE (OUTPATIENT)
Dept: INTERNAL MEDICINE CLINIC | Age: 45
End: 2020-04-01

## 2020-04-01 NOTE — TELEPHONE ENCOUNTER
Patient is requesting to have a 90 day supply for Lantus Solo star to be sent to [facility] ph: [phone].

## 2020-04-01 NOTE — TELEPHONE ENCOUNTER
Called, spoke to pt. Two identifiers confirmed. VV scheduled for 4/6 @ 10 with Dr. Kenna Calderon. Pt verbalized understanding of information discussed w/ no further questions at this time.

## 2020-04-04 NOTE — PROGRESS NOTES
HISTORY OF PRESENT ILLNESS  Aydin Morse is a 38 y.o. male. HPI     Pursuant to the emergency declaration under the [employer] and [facility] authority and the [employer] Act, this Virtual Visit was conducted, with patient's consent, to reduce the patient's risk of exposure to COVID-19 and provide continuity of care for an established patient. Services were provided through a video synchronous discussion virtually to substitute for in-person appointment. VV for CPE and f/u DM-2 on insulin and dyslipidemia  Last a1c 8.8 and LDL 89    fsbs---- fasting in mornings   not on a strict diet-2 meals per day  Some gi intolerance upset stomach on metformin bid. No cp sob or symptoms of neuropathy  Works at [employer]   -2 children  Not taking aspirin daily  Overdue for labs, eye and foot exam and pneumovax/tdap      Patient Active Problem List    Diagnosis Date Noted    Dyslipidemia 01/18/2016    Diabetes mellitus ([facility] [address] 09/26/2011     Current Outpatient Medications   Medication Sig Dispense Refill    insulin glargine (LANTUS) 100 unit/mL injection 40 Units by SubCUTAneous route nightly. DX E11.9 3 Vial 6    tadalafil (CIALIS) 5 mg tablet TAKE 1 TABLET BY MOUTH EVERY DAY AS NEEDED 9 Tab 3    BD ULTRA-FINE SHORT PEN NEEDLE 31 gauge x 5/16\" ndle BY SUBCUTANEOUS ROUTE DAILY. USE EVERY DAY WITH LANTUS PEN 90 Pen Needle 13    metFORMIN (GLUCOPHAGE) 1,000 mg tablet TAKE 1 TABLET BY MOUTH TWICE A DAY WITH A MEAL 180 Tab 3    Blood-Glucose Meter monitoring kit Test before meals and at bedtime. DM 2, insulin dependent.   Length of need: 99. 1 Kit 0    glucose blood VI test strips (FREESTYLE LITE STRIPS) strip Test ac & hs 100 Strip 3    lancets (FREESTYLE LANCETS) 28 gauge misc Test before meals and at bedtime 100 Lancet 11    Insulin Syringes, Disposable, 1 mL syrg 1 Each by Does Not Apply route daily. 30 Syringe 6    Insulin Syringe-Needle U-100 (INSULIN SYRINGE) 1 mL 30 gauge x 5/16 syrg 1 Syringe by Does Not Apply route daily. 100 Syringe 3    Aspirin, Buffered 81 mg tab Take  by mouth. No Known Allergies  No past medical history on file. Social History     Tobacco Use    Smoking status: Never Smoker    Smokeless tobacco: Never Used   Substance Use Topics    Alcohol use: Yes     Alcohol/week: 4.2 standard drinks     Types: 5 Cans of beer per week      Lab Results   Component Value Date/Time    WBC 5.1 07/01/2014 10:48 AM    HGB 16.0 07/01/2014 10:48 AM    HCT 47.4 07/01/2014 10:48 AM    PLATELET 230 18/05/1168 10:48 AM    MCV 88 07/01/2014 10:48 AM     Lab Results   Component Value Date/Time    Glucose 222 (H) 04/02/2018 10:41 AM    Glucose  11/23/2011 10:54 AM    Microalb/Creat ratio (ug/mg creat.) 19.1 04/02/2018 10:41 AM    LDL, calculated 89 04/02/2018 10:41 AM    Creatinine 0.93 04/02/2018 10:41 AM      Lab Results   Component Value Date/Time    Cholesterol, total 164 04/02/2018 10:41 AM    Cholesterol (POC) 199 07/01/2014 04:21 PM    HDL Cholesterol 61 04/02/2018 10:41 AM    LDL, calculated 89 04/02/2018 10:41 AM    LDL Cholesterol (POC) 121 07/01/2014 04:21 PM    Triglyceride 71 04/02/2018 10:41 AM    Triglycerides (POC) 171 07/01/2014 04:21 PM     Lab Results   Component Value Date/Time    GFR est non- 04/02/2018 10:41 AM    GFR est  04/02/2018 10:41 AM    Creatinine 0.93 04/02/2018 10:41 AM    BUN 11 04/02/2018 10:41 AM    Sodium 147 (H) 04/02/2018 10:41 AM    Potassium 4.0 04/02/2018 10:41 AM    Chloride 106 04/02/2018 10:41 AM    CO2 18 04/02/2018 10:41 AM        Review of Systems   Constitutional: Negative for chills, fever, malaise/fatigue and weight loss. Eyes: Negative for blurred vision and double vision. Respiratory: Negative for cough and shortness of breath. Cardiovascular: Negative for chest pain and palpitations.    Gastrointestinal: Negative for abdominal pain, blood in stool, constipation, diarrhea, melena, nausea and vomiting. Genitourinary: Negative for dysuria, frequency, hematuria and urgency. Musculoskeletal: Negative for back pain, falls, joint pain and myalgias. Neurological: Negative for dizziness, tremors and headaches. Physical Exam  Constitutional:       Appearance: Normal appearance. Pulmonary:      Effort: Pulmonary effort is normal.   Neurological:      Mental Status: He is alert. ASSESSMENT and PLAN  Diagnoses and all orders for this visit:    1. Routine general medical examination at a health care facility  -     CBC W/O DIFF  -     METABOLIC PANEL, COMPREHENSIVE  -     LIPID PANEL  -     TSH 3RD GENERATION  -     PSA W/ REFLX FREE PSA   Advised to get pneumovax and tdap  vaccines  2. Uncontrolled type 2 diabetes mellitus with hyperglycemia (HCC)  -     HEMOGLOBIN A1C WITH EAG  -     MICROALBUMIN, UR, RAND W/ MICROALB/CREAT RATIO   Work on diet   Consider adding weekly glp-1-discussed   Add aspirin 81 mg every day   Refer to [facility] for eye exam   Change meformin in xr d/t gi side effects  3. Dyslipidemia    4. Type 2 diabetes mellitus without complication, with long-term current use of insulin (HCC)  -     glucose blood VI test strips (FreeStyle Lite Strips) strip; Test ac & hs  -     insulin glargine (LANTUS) 100 unit/mL injection; 40 Units by SubCUTAneous route nightly. DX E11.9  -     REFERRAL TO OPHTHALMOLOGY    Other orders  -     metFORMIN ER (GLUCOPHAGE XR) 500 mg tablet; Take 4 Tabs by mouth daily (with dinner).

## 2020-04-06 ENCOUNTER — VIRTUAL VISIT (OUTPATIENT)
Dept: INTERNAL MEDICINE CLINIC | Age: 45
End: 2020-04-06

## 2020-04-06 DIAGNOSIS — Z00.00 ROUTINE GENERAL MEDICAL EXAMINATION AT A HEALTH CARE FACILITY: Primary | ICD-10-CM

## 2020-04-06 DIAGNOSIS — E11.9 TYPE 2 DIABETES MELLITUS WITHOUT COMPLICATION, WITH LONG-TERM CURRENT USE OF INSULIN (HCC): ICD-10-CM

## 2020-04-06 DIAGNOSIS — E78.5 DYSLIPIDEMIA: ICD-10-CM

## 2020-04-06 DIAGNOSIS — E11.65 UNCONTROLLED TYPE 2 DIABETES MELLITUS WITH HYPERGLYCEMIA (HCC): ICD-10-CM

## 2020-04-06 DIAGNOSIS — Z79.4 TYPE 2 DIABETES MELLITUS WITHOUT COMPLICATION, WITH LONG-TERM CURRENT USE OF INSULIN (HCC): ICD-10-CM

## 2020-04-06 RX ORDER — METFORMIN HYDROCHLORIDE 500 MG/1
2000 TABLET, EXTENDED RELEASE ORAL
Qty: 360 TAB | Refills: 3 | Status: SHIPPED | OUTPATIENT
Start: 2020-04-06

## 2020-04-06 RX ORDER — INSULIN GLARGINE 100 [IU]/ML
40 INJECTION, SOLUTION SUBCUTANEOUS
Qty: 12 VIAL | Refills: 3 | Status: SHIPPED | OUTPATIENT
Start: 2020-04-06 | End: 2021-03-15

## 2021-02-16 RX ORDER — TADALAFIL 10 MG/1
TABLET ORAL
Qty: 8 TAB | Refills: 6 | Status: SHIPPED | OUTPATIENT
Start: 2021-02-16 | End: 2022-02-19

## 2021-03-15 ENCOUNTER — VIRTUAL VISIT (OUTPATIENT)
Dept: INTERNAL MEDICINE CLINIC | Age: 46
End: 2021-03-15

## 2021-03-15 DIAGNOSIS — E11.9 TYPE 2 DIABETES MELLITUS WITHOUT COMPLICATION, WITH LONG-TERM CURRENT USE OF INSULIN (HCC): ICD-10-CM

## 2021-03-15 DIAGNOSIS — Z11.59 ENCOUNTER FOR HEPATITIS C SCREENING TEST FOR LOW RISK PATIENT: ICD-10-CM

## 2021-03-15 DIAGNOSIS — Z79.4 TYPE 2 DIABETES MELLITUS WITHOUT COMPLICATION, WITH LONG-TERM CURRENT USE OF INSULIN (HCC): ICD-10-CM

## 2021-03-15 DIAGNOSIS — Z79.4 TYPE 2 DIABETES MELLITUS WITHOUT COMPLICATION, WITH LONG-TERM CURRENT USE OF INSULIN (HCC): Primary | ICD-10-CM

## 2021-03-15 DIAGNOSIS — R00.2 PALPITATIONS: ICD-10-CM

## 2021-03-15 DIAGNOSIS — E78.5 DYSLIPIDEMIA: ICD-10-CM

## 2021-03-15 DIAGNOSIS — E11.9 TYPE 2 DIABETES MELLITUS WITHOUT COMPLICATION, WITH LONG-TERM CURRENT USE OF INSULIN (HCC): Primary | ICD-10-CM

## 2021-03-15 PROCEDURE — 99214 OFFICE O/P EST MOD 30 MIN: CPT | Performed by: INTERNAL MEDICINE

## 2021-03-15 RX ORDER — INSULIN GLARGINE 100 [IU]/ML
INJECTION, SOLUTION SUBCUTANEOUS
Qty: 12 VIAL | Refills: 0 | Status: SHIPPED | OUTPATIENT
Start: 2021-03-15 | End: 2021-10-22

## 2021-03-15 NOTE — PROGRESS NOTES
HISTORY OF PRESENT ILLNESS  Samir Lucio is a 54 y.o. male. HPI   VV for CPE and f/u DM-2 on insulin and dyslipidemia  Last labs 2018--a1c 8.8 LDL 89  fsbs 120-160  No recent BP readings  No cp sob  Rare tingling of toes  Has racing heart beat 5 minutes maybe once per week x 1 months    Last OV     Last a1c 8.8 and LDL 89     fsbs---- fasting in mornings   not on a strict diet-2 meals per day  Some gi intolerance upset stomach on metformin bid. No cp sob or symptoms of neuropathy  Works at [employer], [employer]   -2 children  Not taking aspirin daily  Overdue for labs, eye and foot exam and pneumovax/tdap       Patient Active Problem List    Diagnosis Date Noted    Dyslipidemia 01/18/2016    Diabetes mellitus ([facility] [address] 09/26/2011     Current Outpatient Medications   Medication Sig Dispense Refill    Lantus U-100 Insulin 100 unit/mL injection INJECT 40 UNITS UNDER THE SKIN NIGHTLY 12 Vial 0    tadalafiL (CIALIS) 10 mg tablet TAKE ONE TABLET BY MOUTH DAILY AS NEEDED 8 Tab 6    glucose blood VI test strips (FreeStyle Lite Strips) strip Test ac & hs 100 Strip 3    metFORMIN ER (GLUCOPHAGE XR) 500 mg tablet Take 4 Tabs by mouth daily (with dinner). 360 Tab 3    BD ULTRA-FINE SHORT PEN NEEDLE 31 gauge x 5/16\" ndle BY SUBCUTANEOUS ROUTE DAILY. USE EVERY DAY WITH LANTUS PEN 90 Pen Needle 13    Blood-Glucose Meter monitoring kit Test before meals and at bedtime. DM 2, insulin dependent. Length of need: 99. 1 Kit 0    lancets (FREESTYLE LANCETS) 28 gauge misc Test before meals and at bedtime 100 Lancet 11    Insulin Syringes, Disposable, 1 mL syrg 1 Each by Does Not Apply route daily. 30 Syringe 6    Insulin Syringe-Needle U-100 (INSULIN SYRINGE) 1 mL 30 gauge x 5/16 syrg 1 Syringe by Does Not Apply route daily. 100 Syringe 3    Aspirin, Buffered 81 mg tab Take  by mouth.  Every other day       No Known Allergies   Lab Results   Component Value Date/Time    WBC 5.1 07/01/2014 10:48 AM HGB 16.0 07/01/2014 10:48 AM    HCT 47.4 07/01/2014 10:48 AM    PLATELET 032 47/52/0193 10:48 AM    MCV 88 07/01/2014 10:48 AM     Lab Results   Component Value Date/Time    Glucose 222 (H) 04/02/2018 10:41 AM    Glucose  11/23/2011 10:54 AM    Microalb/Creat ratio (ug/mg creat.) 19.1 04/02/2018 10:41 AM    LDL, calculated 89 04/02/2018 10:41 AM    Creatinine 0.93 04/02/2018 10:41 AM      Lab Results   Component Value Date/Time    Cholesterol, total 164 04/02/2018 10:41 AM    Cholesterol (POC) 199 07/01/2014 04:21 PM    HDL Cholesterol 61 04/02/2018 10:41 AM    LDL, calculated 89 04/02/2018 10:41 AM    LDL Cholesterol (POC) 121 07/01/2014 04:21 PM    Triglyceride 71 04/02/2018 10:41 AM    Triglycerides (POC) 171 07/01/2014 04:21 PM     Lab Results   Component Value Date/Time    GFR est non- 04/02/2018 10:41 AM    GFR est  04/02/2018 10:41 AM    Creatinine 0.93 04/02/2018 10:41 AM    BUN 11 04/02/2018 10:41 AM    Sodium 147 (H) 04/02/2018 10:41 AM    Potassium 4.0 04/02/2018 10:41 AM    Chloride 106 04/02/2018 10:41 AM    CO2 18 04/02/2018 10:41 AM        ROS    Physical Exam  Vitals signs and nursing note reviewed. Constitutional:       General: He is not in acute distress. Appearance: He is well-developed. He is obese. Comments: Appears stated age   HENT:      Head: Normocephalic. Pulmonary:      Effort: Pulmonary effort is normal.   Abdominal:      Palpations: Abdomen is soft. Neurological:      General: No focal deficit present. Mental Status: He is alert. ASSESSMENT and PLAN  Diagnoses and all orders for this visit:    1.  Type 2 diabetes mellitus without complication, with long-term current use of insulin ([facility])  -     CBC W/O DIFF  -     METABOLIC PANEL, COMPREHENSIVE  -     LIPID PANEL  -     TSH 3RD GENERATION  -     MICROALBUMIN, UR, RAND W/ MICROALB/CREAT RATIO   Reasonable control per fsbs readings   Advised annual dilated eye exam and  bp monitoring   Refill insulin  2. Dyslipidemia  -     LIPID PANEL    3. Encounter for hepatitis C screening test for low risk patient  -     HEPATITIS C AB    4. Palpitations  -     REFERRAL TO CARDIOLOGY   Fu labs    Follow-up and Dispositions    · Return in about 6 months (around 9/15/2021) for CPE in 6 months.

## 2021-03-15 NOTE — PATIENT INSTRUCTIONS
This is an established visit conducted via telemedicine. The patient has been instructed that this meets HIPAA criteria and acknowledges and agrees to this method of visitation.  
 
Glenys Hardin [place] 
91/35/44 
44:94 PM

## 2021-03-22 ENCOUNTER — TELEPHONE (OUTPATIENT)
Dept: INTERNAL MEDICINE CLINIC | Age: 46
End: 2021-03-22

## 2021-03-22 RX ORDER — INDOMETHACIN 25 MG/1
25 CAPSULE ORAL 3 TIMES DAILY
Qty: 30 CAP | Refills: 1 | Status: SHIPPED | OUTPATIENT
Start: 2021-03-22 | End: 2021-05-06

## 2021-03-22 NOTE — TELEPHONE ENCOUNTER
----- Message from Samira Green sent at 3/22/2021  9:57 AM EDT -----  Regarding: Dr Tavera/; refill  Medication Refill    Caller (if not patient):pt      Relationship of caller (if not patient): pt      Best contact number(s):[phone]      Name of medication and dosage if known: ? Is patient out of this medication (yes/no): yes/ new      Pharmacy name: [facility] listed in chart? (yes/no):  Pharmacy phone [facility]:[phone]      Details to clarify the request: Pt is requesting a Rx for Gout to be called to the pharmacy on file.  Pt advised he is experiencing a flair up and forgot to mention to Dr Asad Dexter on [facility] 1235 visit      Message from [facility]

## 2021-05-06 DIAGNOSIS — Z79.4 TYPE 2 DIABETES MELLITUS WITHOUT COMPLICATION, WITH LONG-TERM CURRENT USE OF INSULIN (HCC): ICD-10-CM

## 2021-05-06 DIAGNOSIS — E11.9 TYPE 2 DIABETES MELLITUS WITHOUT COMPLICATION, WITH LONG-TERM CURRENT USE OF INSULIN (HCC): ICD-10-CM

## 2021-05-06 RX ORDER — INDOMETHACIN 25 MG/1
25 CAPSULE ORAL 3 TIMES DAILY
Qty: 30 CAP | Refills: 1 | Status: SHIPPED | OUTPATIENT
Start: 2021-05-06 | End: 2021-05-16

## 2021-05-06 RX ORDER — PEN NEEDLE, DIABETIC 31 GX5/16"
NEEDLE, DISPOSABLE MISCELLANEOUS
Qty: 1 PACKAGE | Refills: 41 | Status: SHIPPED | OUTPATIENT
Start: 2021-05-06 | End: 2021-10-14 | Stop reason: SDUPTHER

## 2021-06-22 DIAGNOSIS — M25.562 LEFT KNEE PAIN, UNSPECIFIED CHRONICITY: Primary | ICD-10-CM

## 2021-07-02 ENCOUNTER — OFFICE VISIT (OUTPATIENT)
Dept: ORTHOPEDIC SURGERY | Age: 46
End: 2021-07-02
Payer: COMMERCIAL

## 2021-07-02 ENCOUNTER — HOSPITAL ENCOUNTER (OUTPATIENT)
Dept: GENERAL RADIOLOGY | Age: 46
Discharge: HOME | End: 2021-07-02
Payer: COMMERCIAL

## 2021-07-02 VITALS
BODY MASS INDEX: 31.81 KG/M2 | HEART RATE: 79 BPM | WEIGHT: 240 LBS | TEMPERATURE: 97.2 F | OXYGEN SATURATION: 100 % | SYSTOLIC BLOOD PRESSURE: 147 MMHG | DIASTOLIC BLOOD PRESSURE: 87 MMHG | HEIGHT: 73 IN

## 2021-07-02 DIAGNOSIS — M25.562 LEFT KNEE PAIN, UNSPECIFIED CHRONICITY: ICD-10-CM

## 2021-07-02 DIAGNOSIS — M17.12 UNILATERAL PRIMARY OSTEOARTHRITIS, LEFT KNEE: Primary | ICD-10-CM

## 2021-07-02 PROCEDURE — 99203 OFFICE O/P NEW LOW 30 MIN: CPT | Performed by: ORTHOPAEDIC SURGERY

## 2021-07-02 PROCEDURE — 73564 X-RAY EXAM KNEE 4 OR MORE: CPT

## 2021-07-02 RX ORDER — DICLOFENAC SODIUM 75 MG/1
75 TABLET, DELAYED RELEASE ORAL 2 TIMES DAILY
Qty: 60 TABLET | Refills: 0 | Status: SHIPPED | OUTPATIENT
Start: 2021-07-02 | End: 2021-07-26 | Stop reason: SDUPTHER

## 2021-07-02 NOTE — PROGRESS NOTES
7/2/2021    Chief Complaint: Left knee pain    HPI: This is a 54 y.o. male who complains of left knee pain. Onset was gradual.  The patient has had activity dependent pain for many years. The patient has tried activity modification, physical therapy exercises, injections have not been attempted. The pain is in the medial and anterior knee, it is severe at times in intensity. The patient feels unstable with the knee, fears falling, and has significant limitation with activities of daily living, recreation, and walks with no device. History reviewed. No pertinent past medical history. History reviewed. No pertinent surgical history. Current Outpatient Medications on File Prior to Visit   Medication Sig Dispense Refill    BD Ultra-Fine Short Pen Needle 31 gauge x 5/16\" ndle BY SUBCUTANEOUS ROUTE DAILY. USE EVERY DAY WITH LANTUS PEN 1 Package 41    Lantus U-100 Insulin 100 unit/mL injection INJECT 40 UNITS UNDER THE SKIN NIGHTLY 12 Vial 0    tadalafiL (CIALIS) 10 mg tablet TAKE ONE TABLET BY MOUTH DAILY AS NEEDED 8 Tab 6    glucose blood VI test strips (FreeStyle Lite Strips) strip Test ac & hs 100 Strip 3    metFORMIN ER (GLUCOPHAGE XR) 500 mg tablet Take 4 Tabs by mouth daily (with dinner). 360 Tab 3    Blood-Glucose Meter monitoring kit Test before meals and at bedtime. DM 2, insulin dependent. Length of need: 99. 1 Kit 0    lancets (FREESTYLE LANCETS) 28 gauge misc Test before meals and at bedtime 100 Lancet 11    Insulin Syringes, Disposable, 1 mL syrg 1 Each by Does Not Apply route daily. 30 Syringe 6    Insulin Syringe-Needle U-100 (INSULIN SYRINGE) 1 mL 30 gauge x 5/16 syrg 1 Syringe by Does Not Apply route daily. 100 Syringe 3    Aspirin, Buffered 81 mg tab Take  by mouth. Every other day       No current facility-administered medications on file prior to visit.        No Known Allergies    Family History   Problem Relation Age of Onset    Diabetes Mother     Hypertension Father Social History     Socioeconomic History    Marital status: SINGLE     Spouse name: Not on file    Number of children: Not on file    Years of education: Not on file    Highest education level: Not on file   Tobacco Use    Smoking status: Never Smoker    Smokeless tobacco: Never Used   Substance and Sexual Activity    Alcohol use: Yes     Alcohol/week: 4.2 standard drinks     Types: 5 Cans of beer per week    Drug use: Yes     Types: Prescription, OTC    Sexual activity: Yes     Partners: Female     Social Determinants of Health     Financial Resource Strain:     Difficulty of Paying Living Expenses:    Food Insecurity:     Worried About Running Out of Food in the Last Year:     [address] in the Last Year:    Transportation Needs:     Lack of Transportation (Medical):  Lack of Transportation (Non-Medical):    Physical Activity:     Days of Exercise per Week:     Minutes of Exercise per Session:    Stress:     Feeling of Stress :    Social Connections:     Frequency of Communication with Friends and Family:     Frequency of Social Gatherings with Friends and Family:     Attends Sabianist Services:     Active Member of Clubs or Organizations:     Attends Club or Organization Meetings:     Marital Status:          Review of Systems:       General: Denies headache, lethargy, fever, weight loss  Ears/Nose/Throat: Denies ear discharge, drainage, nosebleeds, hoarse voice, dental problems  Cardiovascular: Denies chest pain, shortness of breath  Lungs: Denies chest pain, breathing problems, wheezing, pneumonia  Stomach: Denies stomach pain, heartburn, constipation, irritable bowel  Skin: Denies rash, sores, open wounds  Musculoskeletal: Admits to knee pain, no deformity. Genitourinary: Denies dysuria, hematuria, polyuria  Gastrointestinal: Denies constipation, obstipation, diarrhea  Neurological: Denies changes in sight, smell, hearing, taste, seizures.  Denies loss of consciousness. Psychiatric: Denies depression, sleep pattern changes, anxiety, change in personality  Endocrine: Denies mood swings, heat or cold intolerance  Hematologic/Lymphatic: Denies anemia, purpura, petechia  Allergic/Immunologic: Denies swelling of throat, pain or swelling at lymph nodes      Physical Examination:    Visit Vitals  BP (!) 147/87 (BP 1 Location: Left upper arm, BP Patient Position: Sitting, BP Cuff Size: Large adult)   Pulse 79   Temp 97.2 °F (36.2 °C) (Tympanic)   Ht 6' 1\" (1.854 m)   Wt 240 lb (108.9 kg)   SpO2 100%   BMI 31.66 kg/m²        General: AOX3, no apparent distress  Psychiatric: mood and affect appropriate  Lungs: breathing is symmetric and unlabored bilaterally  Heart: regular rate and rhythm  Abdomen: no guarding  Head: normocephalic, atraumatic  Skin: No significant abnormalities, good turgor  Sensation intact to light touch: L1-S1 dermatomes  Muscular exam: 5/5 strength in all major muscle groups unless noted in specialty exam.    Extremities:      Left upper extremity: Full active and passive range of motion without pain, deformity, no open wound, strength 5/5 in all major muscle groups. Right upper extremity: Full active and passive range of motion without pain, deformity, no open wound, strength 5/5 in all major muscle groups. Right lower extremity: Full active and passive range of motion without pain, deformity, no open wound, strength 5/5 in all major muscle groups. Left lower extremity:  No deformity is noted. Range of motion of the knee is 0-1 20. Ligamentous testing of the knee indicates stability of the the MCL, LCL, PCL, and ACL. Lachman's, anterior and posterior drawer tests are specifically negative. Medial joint line tenderness to palpation is noted. Popliteal area is unremarkable. 1+  for effusion. No patellar crepitus. Patella tracks centrally . Pivot shift is negative.   Strength testing is indicative of 5/5 strength at hip flexion, extension, knee flexion and extension, tibialis anterior, EHL, and FHL. Sensation is intact to light touch in the L1-S1 dermatomes. Capillary refill is less than 2 seconds in the toes. Diagnostics:    Pertinent Diagnostics: X-rays are available of the left knee, mild degenerative changes with minimal spurring at the medial and patellofemoral joints. Assessment: Osteoarthritis left knee    Plan: This patient and I did discuss the many options in treating knee osteoarthritis. We did discuss that we could continue to seek out nonoperative modalities, such as: NSAIDs, oral and topical analgesics, knee injections, knee braces, physical therapy, stretching, strengthening, and weight loss strategies, activity modification, ambulatory assistive devices. The patient stated their understanding with this, but and would like to proceed with nonsurgical management in the form of anti-inflammatory medications, possible injections in the future. Procedures: None today    Mr. Cortez Hull has a reminder for a \"due or due soon\" health maintenance. I have asked that he contact his primary care provider for follow-up on this health maintenance.

## 2021-07-02 NOTE — PROGRESS NOTES
Identified pt with two pt identifiers (name and ). Reviewed chart in preparation for visit and have obtained necessary documentation. Galileo Murrieta is a 54 y.o. male  Chief Complaint   Patient presents with    Knee Pain     LT knee     Visit Vitals  BP (!) 147/87 (BP 1 Location: Left upper arm, BP Patient Position: Sitting, BP Cuff Size: Large adult)   Pulse 79   Temp 97.2 °F (36.2 °C) (Tympanic)   Ht 6' 1\" (1.854 m)   Wt 240 lb (108.9 kg)   SpO2 100%   BMI 31.66 kg/m²     1. Have you been to the ER, urgent care clinic since your last visit? Hospitalized since your last visit? No    2. Have you seen or consulted any other health care providers outside of the [address] [place] [address] since your last visit? Include any pap smears or colon screening.  No

## 2021-07-16 ENCOUNTER — VIRTUAL VISIT (OUTPATIENT)
Dept: ORTHOPEDIC SURGERY | Age: 46
End: 2021-07-16
Payer: COMMERCIAL

## 2021-07-16 DIAGNOSIS — M17.12 UNILATERAL PRIMARY OSTEOARTHRITIS, LEFT KNEE: Primary | ICD-10-CM

## 2021-07-16 PROCEDURE — 99441 PR PHYS/QHP TELEPHONE EVALUATION 5-10 MIN: CPT | Performed by: ORTHOPAEDIC SURGERY

## 2021-07-16 NOTE — PROGRESS NOTES
7/16/2021      CC: Left knee pain    HPI:      This is a 54y.o. year old male who I saw previously for left knee osteoarthritis, I did prescribe for him diclofenac, he is a busy individual physically at work, has done well so far with his current regimen. He states he really does not have any significant pain in his swelling is better than it was. He is satisfied overall with the way this is gone for him. PMH:  No past medical history on file. PSxHx:  No past surgical history on file. Meds:    Current Outpatient Medications:     diclofenac EC (VOLTAREN) 75 mg EC tablet, Take 1 Tablet by mouth two (2) times a day., Disp: 60 Tablet, Rfl: 0    BD Ultra-Fine Short Pen Needle 31 gauge x 5/16\" ndle, BY SUBCUTANEOUS ROUTE DAILY. USE EVERY DAY WITH LANTUS PEN, Disp: 1 Package, Rfl: 41    Lantus U-100 Insulin 100 unit/mL injection, INJECT 40 UNITS UNDER THE SKIN NIGHTLY, Disp: 12 Vial, Rfl: 0    tadalafiL (CIALIS) 10 mg tablet, TAKE ONE TABLET BY MOUTH DAILY AS NEEDED, Disp: 8 Tab, Rfl: 6    glucose blood VI test strips (FreeStyle Lite Strips) strip, Test ac & hs, Disp: 100 Strip, Rfl: 3    metFORMIN ER (GLUCOPHAGE XR) 500 mg tablet, Take 4 Tabs by mouth daily (with dinner). , Disp: 360 Tab, Rfl: 3    Blood-Glucose Meter monitoring kit, Test before meals and at bedtime. DM 2, insulin dependent. Length of need: 99., Disp: 1 Kit, Rfl: 0    lancets (FREESTYLE LANCETS) 28 gauge misc, Test before meals and at bedtime, Disp: 100 Lancet, Rfl: 11    Insulin Syringes, Disposable, 1 mL syrg, 1 Each by Does Not Apply route daily. , Disp: 30 Syringe, Rfl: 6    Insulin Syringe-Needle U-100 (INSULIN SYRINGE) 1 mL 30 gauge x 5/16 syrg, 1 Syringe by Does Not Apply route daily. , Disp: 100 Syringe, Rfl: 3    Aspirin, Buffered 81 mg tab, Take  by mouth.  Every other day, Disp: , Rfl:     All:  No Known Allergies    Social Hx:  Social History     Socioeconomic History    Marital status: SINGLE     Spouse name: Not on file    Number of children: Not on file    Years of education: Not on file    Highest education level: Not on file   Tobacco Use    Smoking status: Never Smoker    Smokeless tobacco: Never Used   Substance and Sexual Activity    Alcohol use: Yes     Alcohol/week: 4.2 standard drinks     Types: 5 Cans of beer per week    Drug use: Yes     Types: Prescription, OTC    Sexual activity: Yes     Partners: Female     Social Determinants of Health     Financial Resource Strain:     Difficulty of Paying Living Expenses:    Food Insecurity:     Worried About Running Out of Food in the Last Year:     [address] in the Last Year:    Transportation Needs:     Lack of Transportation (Medical):  Lack of Transportation (Non-Medical):    Physical Activity:     Days of Exercise per Week:     Minutes of Exercise per Session:    Stress:     Feeling of Stress :    Social Connections:     Frequency of Communication with Friends and Family:     Frequency of Social Gatherings with Friends and Family:     Attends Baptism Services:     Active Member of Clubs or Organizations:     Attends Club or Organization Meetings:     Marital Status:        Family Hx:  Family History   Problem Relation Age of Onset    Diabetes Mother     Hypertension Father          Review of Systems:       General: Denies headache, lethargy, fever, weight loss  Ears/Nose/Throat: Denies ear discharge, drainage, nosebleeds, hoarse voice, dental problems  Cardiovascular: Denies chest pain, shortness of breath  Lungs: Denies chest pain, breathing problems, wheezing, pneumonia  Stomach: Denies stomach pain, heartburn, constipation, irritable bowel  Skin: Denies rash, sores, open wounds  Musculoskeletal: Left knee pain resolved  Genitourinary: Denies dysuria, hematuria, polyuria  Gastrointestinal: Denies constipation, obstipation, diarrhea  Neurological: Denies changes in sight, smell, hearing, taste, seizures.  Denies loss of consciousness. Psychiatric: Denies depression, sleep pattern changes, anxiety, change in personality  Endocrine: Denies mood swings, heat or cold intolerance  Hematologic/Lymphatic: Denies anemia, purpura, petechia  Allergic/Immunologic: Denies swelling of throat, pain or swelling at lymph nodes      Physical Examination:    There were no vitals taken for this visit. General: AOX3, no apparent distress  Psychiatric: mood and affect appropriate        Diagnostics:    Pertinent Diagnostics: None today    Assessment: Left knee osteoarthritis, treated well with anti-inflammatory medications  Plan: This patient will continue his anti-inflammatory medications, have advised him that we can continue this until it either becomes medically unsustainable or that it does not more. He stated his understanding and satisfaction will be to have him follow-up with me on an as-needed basis. At the time of consultation. I was in the office while conducting this encounter. Consent:  He and/or his healthcare decision maker is aware that this patient-initiated Telehealth encounter is a billable service, with coverage as determined by his insurance carrier. He is aware that he may receive a bill and has provided verbal consent to proceed: Yes    This virtual visit was conducted telephone encounter only. -  I affirm this is a Patient Initiated Episode with an Established Patient who has not had a related appointment within my department in the past 7 days or scheduled within the next 24 hours. Note: this encounter is not billable if this call serves to triage the patient into an appointment for the relevant concern. Total Time: minutes: 5-10 minutes. Mr. Tony Barrett has a reminder for a \"due or due soon\" health maintenance. I have asked that he contact his primary care provider for follow-up on this health maintenance.

## 2021-07-26 DIAGNOSIS — M17.12 UNILATERAL PRIMARY OSTEOARTHRITIS, LEFT KNEE: ICD-10-CM

## 2021-07-27 RX ORDER — DICLOFENAC SODIUM 75 MG/1
75 TABLET, DELAYED RELEASE ORAL 2 TIMES DAILY
Qty: 60 TABLET | Refills: 0 | Status: SHIPPED | OUTPATIENT
Start: 2021-07-27

## 2021-09-15 NOTE — TELEPHONE ENCOUNTER
PCP: Derrick Villegas MD    Last appt: 3/15/2021  No future appointments. Requested Prescriptions     Pending Prescriptions Disp Refills    Insulin Syringes, Disposable, 1 mL syrg       Si Each by Does Not Apply route daily.        Prior labs and Blood pressures:  BP Readings from Last 3 Encounters:   21 (!) 147/87   19 133/83   18 141/89     Lab Results   Component Value Date/Time    Sodium 147 (H) 2018 10:41 AM    Potassium 4.0 2018 10:41 AM    Chloride 106 2018 10:41 AM    CO2 18 2018 10:41 AM    Glucose 222 (H) 2018 10:41 AM    BUN 11 2018 10:41 AM    Creatinine 0.93 2018 10:41 AM    BUN/Creatinine ratio 12 2018 10:41 AM    GFR est  2018 10:41 AM    GFR est non- 2018 10:41 AM    Calcium 9.5 2018 10:41 AM     Lab Results   Component Value Date/Time    Hemoglobin A1c (POC) 8.8 (A) 2018 09:35 AM     Lab Results   Component Value Date/Time    Cholesterol, total 164 2018 10:41 AM    Cholesterol (POC) 199 2014 04:21 PM    HDL Cholesterol 61 2018 10:41 AM    HDL Cholesterol (POC) 45 2014 04:21 PM    LDL Cholesterol (POC) 121 2014 04:21 PM    LDL, calculated 89 2018 10:41 AM    VLDL, calculated 14 2018 10:41 AM    Triglyceride 71 2018 10:41 AM    Triglycerides (POC) 171 2014 04:21 PM     No results found for: Loralee Hamman, XQVID3, XQVID, VD3RIA    No results found for: TSH, TSH2, TSH3, TSHP, TSHEXT

## 2021-09-24 NOTE — TELEPHONE ENCOUNTER
----- Message from Nawaf Price sent at 9/24/2021  4:25 PM EDT -----  Regarding: Dr. Rodrick Quinn (if not patient):      Relationship of caller (if not patient):      Best contact number(s):[phone]      Name of medication and dosage if known:Syringes came without needles on them. Is patient out of this medication (yes/no):      [facility]     Pharmacy listed in chart? (yes/no):yes  Pharmacy phone number:[phone]      Details to clarify the request:n/a      Message from [facility]

## 2021-09-27 NOTE — TELEPHONE ENCOUNTER
Future Appointments:  No future appointments. Last Appointment With Me:  Visit date not found     Requested Prescriptions     Pending Prescriptions Disp Refills    Insulin Syringes, Disposable, 1 mL syrg 30 Each 1     Si Each by Does Not Apply route daily.

## 2021-10-14 DIAGNOSIS — E11.9 TYPE 2 DIABETES MELLITUS WITHOUT COMPLICATION, WITH LONG-TERM CURRENT USE OF INSULIN (HCC): ICD-10-CM

## 2021-10-14 DIAGNOSIS — Z79.4 TYPE 2 DIABETES MELLITUS WITHOUT COMPLICATION, WITH LONG-TERM CURRENT USE OF INSULIN (HCC): ICD-10-CM

## 2021-10-14 RX ORDER — PEN NEEDLE, DIABETIC 30 GX3/16"
NEEDLE, DISPOSABLE MISCELLANEOUS
Qty: 1 EACH | Refills: 5 | Status: SHIPPED | OUTPATIENT
Start: 2021-10-14 | End: 2022-04-19 | Stop reason: ALTCHOICE

## 2021-10-14 NOTE — TELEPHONE ENCOUNTER
Patient states he needs a call back in reference to Wrong Needles being sent to the Pharmacy on 9/27/21. Patient states \"he needs an Insulin Needle that he can see the amount being Sucked up into the Syringe. \" Please call to discuss & advise.  Thank you

## 2021-10-14 NOTE — TELEPHONE ENCOUNTER
PCP: Morgan Grant MD    Last appt: 3/15/2021  No future appointments. Requested Prescriptions     Pending Prescriptions Disp Refills    Insulin Needles, Disposable, (BD Ultra-Fine Short Pen Needle) 31 gauge x 5/16\" ndle       Sig: BY SUBCUTANEOUS ROUTE DAILY.  USE EVERY DAY WITH LANTUS PEN       Prior labs and Blood pressures:  BP Readings from Last 3 Encounters:   07/02/21 (!) 147/87   06/24/19 133/83   04/02/18 141/89     Lab Results   Component Value Date/Time    Sodium 147 (H) 04/02/2018 10:41 AM    Potassium 4.0 04/02/2018 10:41 AM    Chloride 106 04/02/2018 10:41 AM    CO2 18 04/02/2018 10:41 AM    Glucose 222 (H) 04/02/2018 10:41 AM    BUN 11 04/02/2018 10:41 AM    Creatinine 0.93 04/02/2018 10:41 AM    BUN/Creatinine ratio 12 04/02/2018 10:41 AM    GFR est  04/02/2018 10:41 AM    GFR est non- 04/02/2018 10:41 AM    Calcium 9.5 04/02/2018 10:41 AM     Lab Results   Component Value Date/Time    Hemoglobin A1c (POC) 8.8 (A) 04/02/2018 09:35 AM     Lab Results   Component Value Date/Time    Cholesterol, total 164 04/02/2018 10:41 AM    Cholesterol (POC) 199 07/01/2014 04:21 PM    HDL Cholesterol 61 04/02/2018 10:41 AM    HDL Cholesterol (POC) 45 07/01/2014 04:21 PM    LDL Cholesterol (POC) 121 07/01/2014 04:21 PM    LDL, calculated 89 04/02/2018 10:41 AM    VLDL, calculated 14 04/02/2018 10:41 AM    Triglyceride 71 04/02/2018 10:41 AM    Triglycerides (POC) 171 07/01/2014 04:21 PM     No results found for: Ricky Parham, XQVID3, XQVID, VD3RIA    No results found for: TSH, TSH2, TSH3, TSHP, TSHEXT

## 2021-10-22 DIAGNOSIS — E11.9 TYPE 2 DIABETES MELLITUS WITHOUT COMPLICATION, WITH LONG-TERM CURRENT USE OF INSULIN (HCC): ICD-10-CM

## 2021-10-22 DIAGNOSIS — Z79.4 TYPE 2 DIABETES MELLITUS WITHOUT COMPLICATION, WITH LONG-TERM CURRENT USE OF INSULIN (HCC): ICD-10-CM

## 2021-10-22 RX ORDER — INSULIN GLARGINE 100 [IU]/ML
INJECTION, SOLUTION SUBCUTANEOUS
Qty: 90 ML | Refills: 2 | Status: SHIPPED | OUTPATIENT
Start: 2021-10-22 | End: 2021-11-18

## 2021-11-18 DIAGNOSIS — Z79.4 TYPE 2 DIABETES MELLITUS WITHOUT COMPLICATION, WITH LONG-TERM CURRENT USE OF INSULIN (HCC): ICD-10-CM

## 2021-11-18 DIAGNOSIS — E11.9 TYPE 2 DIABETES MELLITUS WITHOUT COMPLICATION, WITH LONG-TERM CURRENT USE OF INSULIN (HCC): ICD-10-CM

## 2021-11-18 RX ORDER — INSULIN GLARGINE 100 [IU]/ML
INJECTION, SOLUTION SUBCUTANEOUS
Qty: 120 ML | Refills: 3 | Status: SHIPPED | OUTPATIENT
Start: 2021-11-18 | End: 2022-04-19

## 2022-01-21 RX ORDER — INSULIN GLARGINE 100 [IU]/ML
40 INJECTION, SOLUTION SUBCUTANEOUS
Qty: 120 ML | Refills: 0 | Status: SHIPPED | OUTPATIENT
Start: 2022-01-21 | End: 2022-02-10 | Stop reason: SDUPTHER

## 2022-01-21 NOTE — TELEPHONE ENCOUNTER
Patient states he needs a call back in reference to his insurance is No Longer covering his Lantus U-100 Insulin 100 unit/mL injection & needs to discuss getting an Alternative prescribed that his Insurance advised they covered. Please call to discuss & advise.  Thank you

## 2022-02-10 RX ORDER — INSULIN GLARGINE 100 [IU]/ML
40 INJECTION, SOLUTION SUBCUTANEOUS
Qty: 120 ML | Refills: 0 | Status: SHIPPED | OUTPATIENT
Start: 2022-02-10 | End: 2022-04-08 | Stop reason: SDUPTHER

## 2022-02-11 NOTE — TELEPHONE ENCOUNTER
INTERFACE RECEIVED FROM PHARMACY IN REGARDS TO REQUEST. PCP: Sathya Celestin MD    Last appt: 3/15/2021  No future appointments.     Requested Prescriptions      No prescriptions requested or ordered in this encounter       Prior labs and Blood pressures:  BP Readings from Last 3 Encounters:   07/02/21 (!) 147/87   06/24/19 133/83   04/02/18 141/89     Lab Results   Component Value Date/Time    Sodium 147 (H) 04/02/2018 10:41 AM    Potassium 4.0 04/02/2018 10:41 AM    Chloride 106 04/02/2018 10:41 AM    CO2 18 04/02/2018 10:41 AM    Glucose 222 (H) 04/02/2018 10:41 AM    BUN 11 04/02/2018 10:41 AM    Creatinine 0.93 04/02/2018 10:41 AM    BUN/Creatinine ratio 12 04/02/2018 10:41 AM    GFR est  04/02/2018 10:41 AM    GFR est non- 04/02/2018 10:41 AM    Calcium 9.5 04/02/2018 10:41 AM     Lab Results   Component Value Date/Time    Hemoglobin A1c (POC) 8.8 (A) 04/02/2018 09:35 AM     Lab Results   Component Value Date/Time    Cholesterol, total 164 04/02/2018 10:41 AM    Cholesterol (POC) 199 07/01/2014 04:21 PM    HDL Cholesterol 61 04/02/2018 10:41 AM    HDL Cholesterol (POC) 45 07/01/2014 04:21 PM    LDL Cholesterol (POC) 121 07/01/2014 04:21 PM    LDL, calculated 89 04/02/2018 10:41 AM    VLDL, calculated 14 04/02/2018 10:41 AM    Triglyceride 71 04/02/2018 10:41 AM    Triglycerides (POC) 171 07/01/2014 04:21 PM     No results found for: Jessie Bath, XQVID3, XQVID, VD3RIA    No results found for: TSH, TSH2, TSH3, TSHP, TSHEXT

## 2022-02-19 RX ORDER — TADALAFIL 10 MG/1
TABLET ORAL
Qty: 8 TABLET | Refills: 6 | Status: SHIPPED | OUTPATIENT
Start: 2022-02-19

## 2022-04-08 RX ORDER — INSULIN GLARGINE 100 [IU]/ML
40 INJECTION, SOLUTION SUBCUTANEOUS
Qty: 120 ML | Refills: 0 | Status: SHIPPED | OUTPATIENT
Start: 2022-04-08 | End: 2022-04-15 | Stop reason: SDUPTHER

## 2022-04-08 RX ORDER — INSULIN GLARGINE 100 [IU]/ML
40 INJECTION, SOLUTION SUBCUTANEOUS
Qty: 120 ML | Refills: 0 | Status: CANCELLED | OUTPATIENT
Start: 2022-04-08

## 2022-04-08 NOTE — TELEPHONE ENCOUNTER
Future Appointments:  No future appointments. Last Appointment With Me:  Visit date not found     Requested Prescriptions     Pending Prescriptions Disp Refills    insulin glargine (Semglee U-100 Insulin) 100 unit/mL injection 120 mL 0     Si Units by SubCUTAneous route nightly.

## 2022-04-08 NOTE — TELEPHONE ENCOUNTER
Patient states he needs a New Prescription done thru [address] but also needs a Small Amount done thru Local [facility] done on separate refill encounter. Patient states [facility] advised Insurance will not cover until 4/18/22. Please call if any questions.  Thank you

## 2022-04-08 NOTE — TELEPHONE ENCOUNTER
Patient states he needs refill for small amount thru [id number] on file/indicated. Please call if any questions.  Thank you

## 2022-04-08 NOTE — TELEPHONE ENCOUNTER
Called pt, 2 identifiers, pt needs refill on insulin to [facility] due to [facility] will not fill until 4/18, pts last visit March 2021, made annual appt due to needing annual physical

## 2022-04-15 RX ORDER — INSULIN GLARGINE 100 [IU]/ML
40 INJECTION, SOLUTION SUBCUTANEOUS
Qty: 120 ML | Refills: 0 | Status: SHIPPED | OUTPATIENT
Start: 2022-04-15 | End: 2022-04-19

## 2022-04-15 NOTE — TELEPHONE ENCOUNTER
----- Message from Rod Cheyanne sent at 4/15/2022  8:27 AM EDT -----  Subject: Refill Request    QUESTIONS  Name of Medication? insulin glargine (Semglee U-100 Insulin) 100 unit/mL   injection  Patient-reported dosage and instructions? take as needed   How many days do you have left? 0  [id number]  Pharmacy phone number (if available)? [phone]  Additional Information for Provider? The patient said that he is out and   pharmacy said that they can not refill it until the 18th   ---------------------------------------------------------------------------  --------------  [address]  What is the best way for the office to contact you? OK to leave message on   voicemail  Preferred Call Back Phone Number? [phone]  ---------------------------------------------------------------------------  --------------  SCRIPT ANSWERS  Relationship to Patient?  Self

## 2022-04-18 NOTE — PROGRESS NOTES
HISTORY OF PRESENT ILLNESS  Cristin Lott is a 51 y.o. male. HPI   Here  for f/u DM-2 on insulin obesity and dyslipidemia    Takes semglee but only taking 27 units qdi instead of 40 units every day for last 2.5 months  fsbs 120-150 after breakfast  Denies excess thirst or urination  No cp or sob, has intermittent tingling in toes b/;  Due for PCV 13    Last OV    Last labs 2018--a1c 8.8 LDL 89  fsbs 120-160  No recent BP readings  No cp sob  Rare tingling of toes  Has racing heart beat 5 minutes maybe once per week x 1 months       Patient Active Problem List    Diagnosis Date Noted    Dyslipidemia 01/18/2016    Diabetes mellitus ([facility] [address] 09/26/2011     Current Outpatient Medications   Medication Sig Dispense Refill    Semglee,insulin glargine-yfgn, 100 unit/mL soln 40 Units by SubCUTAneous route daily. 15 Each 3    valsartan-hydroCHLOROthiazide (DIOVAN-HCT) 80-12.5 mg per tablet Take 1 Tablet by mouth daily. 90 Tablet 1    tadalafiL (CIALIS) 10 mg tablet TAKE ONE TABLET BY MOUTH DAILY AS NEEDED 8 Tablet 6    Insulin Syringes, Disposable, 1 mL syrg 1 Each by Does Not Apply route daily. 30 Each 1    diclofenac EC (VOLTAREN) 75 mg EC tablet Take 1 Tablet by mouth two (2) times a day. 60 Tablet 0    glucose blood VI test strips (FreeStyle Lite Strips) strip Test ac & hs 100 Strip 3    metFORMIN ER (GLUCOPHAGE XR) 500 mg tablet Take 4 Tabs by mouth daily (with dinner). 360 Tab 3    Blood-Glucose Meter monitoring kit Test before meals and at bedtime. DM 2, insulin dependent. Length of need: 99. 1 Kit 0    lancets (FREESTYLE LANCETS) 28 gauge misc Test before meals and at bedtime 100 Lancet 11    Insulin Syringe-Needle U-100 (INSULIN SYRINGE) 1 mL 30 gauge x 5/16 syrg 1 Syringe by Does Not Apply route daily. 100 Syringe 3    Aspirin, Buffered 81 mg tab Take  by mouth.  Every other day       No Known Allergies   Lab Results   Component Value Date/Time    WBC 5.1 07/01/2014 10:48 AM    HGB 16.0 07/01/2014 10:48 AM    HCT 47.4 07/01/2014 10:48 AM    PLATELET 871 57/34/3024 10:48 AM    MCV 88 07/01/2014 10:48 AM     Lab Results   Component Value Date/Time    Cholesterol, total 164 04/02/2018 10:41 AM    Cholesterol (POC) 199 07/01/2014 04:21 PM    HDL Cholesterol 61 04/02/2018 10:41 AM    LDL, calculated 89 04/02/2018 10:41 AM    LDL Cholesterol (POC) 121 07/01/2014 04:21 PM    Triglyceride 71 04/02/2018 10:41 AM    Triglycerides (POC) 171 07/01/2014 04:21 PM     Lab Results   Component Value Date/Time    GFR est non- 04/02/2018 10:41 AM    GFR est  04/02/2018 10:41 AM    Creatinine 0.93 04/02/2018 10:41 AM    BUN 11 04/02/2018 10:41 AM    Sodium 147 (H) 04/02/2018 10:41 AM    Potassium 4.0 04/02/2018 10:41 AM    Chloride 106 04/02/2018 10:41 AM    CO2 18 04/02/2018 10:41 AM     No results found for: TSH, TSH2, TSH3, TSHP, TSHELE, TSHEXT, TT3, T3U, T3UP, FRT3, FT3, FT4, FT4P, T4, T4P, FT4T, TT7, TSHEXT      Review of Systems   Constitutional: Negative for chills, fever, malaise/fatigue and weight loss. HENT: Negative. Eyes: Negative for blurred vision and double vision. Respiratory: Negative for cough and shortness of breath. Cardiovascular: Negative for chest pain and palpitations. Gastrointestinal: Negative for abdominal pain, blood in stool, constipation, diarrhea, melena, nausea and vomiting. Genitourinary: Negative for dysuria, frequency, hematuria and urgency. Musculoskeletal: Negative for back pain, falls, joint pain and myalgias. Skin: Negative. Neurological: Negative for dizziness, tremors and headaches. Physical Exam  Vitals and nursing note reviewed. Constitutional:       General: He is not in acute distress. Appearance: He is well-developed. Comments: Appears stated age   HENT:      Head: Normocephalic. Right Ear: Tympanic membrane normal.      Left Ear: Tympanic membrane normal.   Cardiovascular:      Rate and Rhythm: Normal rate and regular rhythm. Heart sounds: Normal heart sounds. Pulmonary:      Effort: Pulmonary effort is normal.      Breath sounds: Normal breath sounds. Abdominal:      Palpations: Abdomen is soft. Musculoskeletal:         General: Normal range of motion. Cervical back: Normal range of motion. Right lower leg: No edema. Skin:     General: Skin is warm. Neurological:      Mental Status: He is alert. ASSESSMENT and PLAN  Diagnoses and all orders for this visit:    1. Routine general medical examination at a health care facility  -     HEPATITIS C AB; Future  -     CBC W/O DIFF; Future  -     METABOLIC PANEL, COMPREHENSIVE; Future  -     LIPID PANEL; Future  -     TSH 3RD GENERATION; Future  -     REFERRAL TO GASTROENTEROLOGY    2. Uncontrolled type 2 diabetes mellitus with hyperglycemia (HCC)  -     MICROALBUMIN, UR, RAND W/ MICROALB/CREAT RATIO; Future  -     HEMOGLOBIN A1C WITH EAG; Future   Fu a1c   Increase semglee 40 units every day   Continue metformin  3. Dyslipidemia    4. HTN   Start diovan/hct 80/12.5 mg qd  Other orders  -     Semglee,insulin glargine-yfgn, 100 unit/mL soln; 40 Units by SubCUTAneous route daily. -     valsartan-hydroCHLOROthiazide (DIOVAN-HCT) 80-12.5 mg per tablet; Take 1 Tablet by mouth daily. Follow-up and Dispositions    · Return in about 2 months (around 6/19/2022) for FU HTN DM_@.

## 2022-04-19 ENCOUNTER — OFFICE VISIT (OUTPATIENT)
Dept: INTERNAL MEDICINE CLINIC | Age: 47
End: 2022-04-19
Payer: COMMERCIAL

## 2022-04-19 VITALS
RESPIRATION RATE: 16 BRPM | TEMPERATURE: 97.6 F | BODY MASS INDEX: 34.06 KG/M2 | OXYGEN SATURATION: 100 % | DIASTOLIC BLOOD PRESSURE: 100 MMHG | SYSTOLIC BLOOD PRESSURE: 160 MMHG | WEIGHT: 257 LBS | HEIGHT: 73 IN | HEART RATE: 82 BPM

## 2022-04-19 DIAGNOSIS — E78.5 DYSLIPIDEMIA: ICD-10-CM

## 2022-04-19 DIAGNOSIS — E11.65 UNCONTROLLED TYPE 2 DIABETES MELLITUS WITH HYPERGLYCEMIA (HCC): Primary | ICD-10-CM

## 2022-04-19 DIAGNOSIS — Z00.00 ROUTINE GENERAL MEDICAL EXAMINATION AT A HEALTH CARE FACILITY: ICD-10-CM

## 2022-04-19 DIAGNOSIS — I10 HYPERTENSION, UNSPECIFIED TYPE: ICD-10-CM

## 2022-04-19 PROCEDURE — 99214 OFFICE O/P EST MOD 30 MIN: CPT | Performed by: INTERNAL MEDICINE

## 2022-04-19 RX ORDER — INSULIN GLARGINE-YFGN 100 [IU]/ML
40 INJECTION, SOLUTION SUBCUTANEOUS DAILY
Qty: 15 EACH | Refills: 3 | Status: SHIPPED | OUTPATIENT
Start: 2022-04-19 | End: 2022-04-19 | Stop reason: SDUPTHER

## 2022-04-19 RX ORDER — VALSARTAN AND HYDROCHLOROTHIAZIDE 80; 12.5 MG/1; MG/1
1 TABLET, FILM COATED ORAL DAILY
Qty: 90 TABLET | Refills: 1 | Status: SHIPPED | OUTPATIENT
Start: 2022-04-19 | End: 2022-07-12 | Stop reason: SDUPTHER

## 2022-04-19 RX ORDER — INSULIN GLARGINE-YFGN 100 [IU]/ML
40 INJECTION, SOLUTION SUBCUTANEOUS DAILY
Qty: 15 EACH | Refills: 3 | Status: SHIPPED | OUTPATIENT
Start: 2022-04-19 | End: 2022-06-24 | Stop reason: SDUPTHER

## 2022-04-19 RX ORDER — INSULIN GLARGINE-YFGN 100 [IU]/ML
INJECTION, SOLUTION SUBCUTANEOUS DAILY
COMMUNITY
Start: 2022-02-08 | End: 2022-04-19 | Stop reason: SDUPTHER

## 2022-04-19 NOTE — PROGRESS NOTES
1. \"Have you been to the ER, urgent care clinic since your last visit? Hospitalized since your last visit? \" No    2. \"Have you seen or consulted any other health care providers outside of the [address] [place] [address] since your last visit? \" No     3. For patients aged 39-70: Has the patient had a colonoscopy / FIT/ Cologuard?  No

## 2022-04-19 NOTE — PATIENT INSTRUCTIONS
Office Policies    Phone calls/patient messages:            Please allow up to 24 hours for someone in the office to contact you about your call or message. Be mindful your provider may be out of the office or your message may require further review. We encourage you to use [employer] for your messages as this is a faster, more efficient way to communicate with our office                         Medication Refills:            Prescription medications require 48-72 business hours to process. We encourage you to use [employer] for your refills. For controlled medications: Please allow 72 business hours to process. Certain medications may require you to  a written prescription at our office. NO narcotic/controlled medications will be prescribed after 4pm Monday through Friday or on weekends              Form/Paperwork Completion:            Please note a $25 fee may incur for all paperwork for completed by our providers. We ask that you allow 7-10 business days. Pre-payment is due prior to picking up/faxing the completed form. You may also download your forms to [employer] to have your doctor print off.

## 2022-04-20 LAB
ALBUMIN SERPL-MCNC: 4.3 G/DL (ref 3.5–5)
ALBUMIN/GLOB SERPL: 1.3 {RATIO} (ref 1.1–2.2)
ALP SERPL-CCNC: 106 U/L (ref 45–117)
ALT SERPL-CCNC: 68 U/L (ref 12–78)
ANION GAP SERPL CALC-SCNC: 4 MMOL/L (ref 5–15)
AST SERPL-CCNC: 26 U/L (ref 15–37)
BILIRUB SERPL-MCNC: 0.6 MG/DL (ref 0.2–1)
BUN SERPL-MCNC: 13 MG/DL (ref 6–20)
BUN/CREAT SERPL: 11 (ref 12–20)
CALCIUM SERPL-MCNC: 9.2 MG/DL (ref 8.5–10.1)
CHLORIDE SERPL-SCNC: 107 MMOL/L (ref 97–108)
CHOLEST SERPL-MCNC: 182 MG/DL
CO2 SERPL-SCNC: 27 MMOL/L (ref 21–32)
CREAT SERPL-MCNC: 1.15 MG/DL (ref 0.7–1.3)
CREAT UR-MCNC: 330 MG/DL
ERYTHROCYTE [DISTWIDTH] IN BLOOD BY AUTOMATED COUNT: 12.7 % (ref 11.5–14.5)
EST. AVERAGE GLUCOSE BLD GHB EST-MCNC: 192 MG/DL
GLOBULIN SER CALC-MCNC: 3.3 G/DL (ref 2–4)
GLUCOSE SERPL-MCNC: 168 MG/DL (ref 65–100)
HBA1C MFR BLD: 8.3 % (ref 4–5.6)
HCT VFR BLD AUTO: 47.1 % (ref 36.6–50.3)
HCV AB SERPL QL IA: NONREACTIVE
HDLC SERPL-MCNC: 64 MG/DL
HDLC SERPL: 2.8 {RATIO} (ref 0–5)
HGB BLD-MCNC: 15.4 G/DL (ref 12.1–17)
LDLC SERPL CALC-MCNC: 98.4 MG/DL (ref 0–100)
MCH RBC QN AUTO: 29.7 PG (ref 26–34)
MCHC RBC AUTO-ENTMCNC: 32.7 G/DL (ref 30–36.5)
MCV RBC AUTO: 90.8 FL (ref 80–99)
MICROALBUMIN UR-MCNC: 22.7 MG/DL
MICROALBUMIN/CREAT UR-RTO: 69 MG/G (ref 0–30)
NRBC # BLD: 0 K/UL (ref 0–0.01)
NRBC BLD-RTO: 0 PER 100 WBC
PLATELET # BLD AUTO: 202 K/UL (ref 150–400)
PMV BLD AUTO: 11.8 FL (ref 8.9–12.9)
POTASSIUM SERPL-SCNC: 4.2 MMOL/L (ref 3.5–5.1)
PROT SERPL-MCNC: 7.6 G/DL (ref 6.4–8.2)
RBC # BLD AUTO: 5.19 M/UL (ref 4.1–5.7)
SODIUM SERPL-SCNC: 138 MMOL/L (ref 136–145)
TRIGL SERPL-MCNC: 98 MG/DL (ref ?–150)
TSH SERPL DL<=0.05 MIU/L-ACNC: 1.3 UIU/ML (ref 0.36–3.74)
VLDLC SERPL CALC-MCNC: 19.6 MG/DL
WBC # BLD AUTO: 4.9 K/UL (ref 4.1–11.1)

## 2022-04-20 NOTE — PROGRESS NOTES
Telll pt slihgt urine protein--diovan/hct should help his bp and urine protein just prescribed yesteray lytes kidney liver cholesterol amd cbc ok  A1c up slightly --taking 27 units of semplee daily--increase to 32 units every day instead of 40 units every day.   Fu in 2 months

## 2022-04-20 NOTE — PROGRESS NOTES
Spoke with patient using 2 identifiers. Patient was informed of recent lab findings and Dr. Andrés Shepard recommendations. Patient verbalized understanding.

## 2022-06-24 RX ORDER — INSULIN GLARGINE-YFGN 100 [IU]/ML
40 INJECTION, SOLUTION SUBCUTANEOUS DAILY
Qty: 15 EACH | Refills: 3 | Status: SHIPPED | OUTPATIENT
Start: 2022-06-24 | End: 2022-10-07 | Stop reason: SDUPTHER

## 2022-06-24 NOTE — TELEPHONE ENCOUNTER
----- Message from Giovani Hopkins sent at 6/24/2022 12:03 PM EDT -----  Subject: Refill Request    QUESTIONS  Name of Medication? Semglee,insulin glargine-yfgn, 100 unit/mL soln  Patient-reported dosage and instructions? 1 shot per day  How many days do you have left? 2  Preferred Pharmacy? [id number]  phone number (if available)? [phone]  ---------------------------------------------------------------------------  --------------  CALL BACK INFO  What is the best way for the office to contact you? OK to leave message on   voicemail  Preferred Call Back Phone Number? [phone]  ---------------------------------------------------------------------------  --------------  SCRIPT ANSWERS  Relationship to Patient?  Self

## 2022-06-24 NOTE — TELEPHONE ENCOUNTER
Future Appointments:  Future Appointments   Date Time Provider Jessica Aldanai   2022 11:15 AM Paulo Salinas MD [facility] BS AMB        Last Appointment With Me:  2022     Requested Prescriptions     Pending Prescriptions Disp Refills    Semglee,insulin glargine-yfgn, 100 unit/mL soln 15 Each 3     Si Units by SubCUTAneous route daily.

## 2022-07-12 ENCOUNTER — OFFICE VISIT (OUTPATIENT)
Dept: INTERNAL MEDICINE CLINIC | Age: 47
End: 2022-07-12
Payer: COMMERCIAL

## 2022-07-12 VITALS
SYSTOLIC BLOOD PRESSURE: 128 MMHG | WEIGHT: 249 LBS | OXYGEN SATURATION: 100 % | TEMPERATURE: 97 F | HEART RATE: 74 BPM | HEIGHT: 73 IN | BODY MASS INDEX: 33 KG/M2 | RESPIRATION RATE: 16 BRPM | DIASTOLIC BLOOD PRESSURE: 82 MMHG

## 2022-07-12 DIAGNOSIS — I10 HYPERTENSION, UNSPECIFIED TYPE: ICD-10-CM

## 2022-07-12 DIAGNOSIS — E11.29 TYPE 2 DIABETES MELLITUS WITH MICROALBUMINURIA, WITH LONG-TERM CURRENT USE OF INSULIN (HCC): Primary | ICD-10-CM

## 2022-07-12 DIAGNOSIS — Z79.4 TYPE 2 DIABETES MELLITUS WITH MICROALBUMINURIA, WITH LONG-TERM CURRENT USE OF INSULIN (HCC): Primary | ICD-10-CM

## 2022-07-12 DIAGNOSIS — R80.9 TYPE 2 DIABETES MELLITUS WITH MICROALBUMINURIA, WITH LONG-TERM CURRENT USE OF INSULIN (HCC): Primary | ICD-10-CM

## 2022-07-12 PROCEDURE — 99213 OFFICE O/P EST LOW 20 MIN: CPT | Performed by: INTERNAL MEDICINE

## 2022-07-12 RX ORDER — VALSARTAN AND HYDROCHLOROTHIAZIDE 80; 12.5 MG/1; MG/1
1 TABLET, FILM COATED ORAL DAILY
Qty: 90 TABLET | Refills: 3 | Status: SHIPPED | OUTPATIENT
Start: 2022-07-12

## 2022-07-12 NOTE — PROGRESS NOTES
HISTORY OF PRESENT ILLNESS  Kimberlyn Ledezma is a 51 y.o. male. HPI   Fu dm-2 htn  semglee was increased by 5 units to 32 units every day for a1c of 8.3  fsbs around 115-120 fasting now but depends on what he eats  Diovan/hct was started for HTN-sometimes feels dizzy now    Last OV  Here  for f/u DM-2 on insulin obesity and dyslipidemia     Takes semglee but only taking 27 units qdi instead of 40 units every day for last 2.5 months  fsbs 120-150 after breakfast  Denies excess thirst or urination  No cp or sob, has intermittent tingling in toes b/;  Due for PCV 13    Patient Active Problem List    Diagnosis Date Noted    Dyslipidemia 01/18/2016    Diabetes mellitus ([facility] [address] 09/26/2011     Current Outpatient Medications   Medication Sig Dispense Refill    valsartan-hydroCHLOROthiazide (DIOVAN-HCT) 80-12.5 mg per tablet Take 1 Tablet by mouth daily. 90 Tablet 3    Semglee,insulin glargine-yfgn, 100 unit/mL soln 40 Units by SubCUTAneous route daily. 15 Each 3    tadalafiL (CIALIS) 10 mg tablet TAKE ONE TABLET BY MOUTH DAILY AS NEEDED 8 Tablet 6    Insulin Syringes, Disposable, 1 mL syrg 1 Each by Does Not Apply route daily. 30 Each 1    diclofenac EC (VOLTAREN) 75 mg EC tablet Take 1 Tablet by mouth two (2) times a day. 60 Tablet 0    glucose blood VI test strips (FreeStyle Lite Strips) strip Test ac & hs 100 Strip 3    metFORMIN ER (GLUCOPHAGE XR) 500 mg tablet Take 4 Tabs by mouth daily (with dinner). 360 Tab 3    Blood-Glucose Meter monitoring kit Test before meals and at bedtime. DM 2, insulin dependent. Length of need: 99. 1 Kit 0    lancets (FREESTYLE LANCETS) 28 gauge misc Test before meals and at bedtime 100 Lancet 11    Insulin Syringe-Needle U-100 (INSULIN SYRINGE) 1 mL 30 gauge x 5/16 syrg 1 Syringe by Does Not Apply route daily. 100 Syringe 3    Aspirin, Buffered 81 mg tab Take  by mouth.  Every other day       No Known Allergies   Lab Results   Component Value Date/Time    WBC 4.9 04/19/2022 09:59 AM    HGB 15.4 04/19/2022 09:59 AM    HCT 47.1 04/19/2022 09:59 AM    PLATELET 466 58/64/3878 09:59 AM    MCV 90.8 04/19/2022 09:59 AM     Lab Results   Component Value Date/Time    GFR est non-AA >60 04/19/2022 09:59 AM    GFR est AA >60 04/19/2022 09:59 AM    Creatinine 1.15 04/19/2022 09:59 AM    BUN 13 04/19/2022 09:59 AM    Sodium 138 04/19/2022 09:59 AM    Potassium 4.2 04/19/2022 09:59 AM    Chloride 107 04/19/2022 09:59 AM    CO2 27 04/19/2022 09:59 AM        ROS    Physical Exam  Vitals and nursing note reviewed. Constitutional:       General: He is not in acute distress. Appearance: He is well-developed. Comments: Appears stated age   HENT:      Head: Normocephalic. Cardiovascular:      Rate and Rhythm: Normal rate and regular rhythm. Heart sounds: Normal heart sounds. Pulmonary:      Effort: Pulmonary effort is normal.      Breath sounds: Normal breath sounds. Abdominal:      Palpations: Abdomen is soft. Neurological:      Mental Status: He is alert. Comments:      Diabetic foot exam performed by Ekaterina Agudelo MD       Measurement  Response Nurse Comment Physician Comment  Monofilament  R - normal sensation with micro filament  L - normal sensation with micro filament    Pulse DP R - 2+ (normal)  L - 2+ (normal)    Pulse TP R - 2+ (normal)  L - 2+ (normal)    Structural deformity R - None  L - None    Skin Integrity / Deformity R - None  L - None       Reviewed by:               ASSESSMENT and PLAN  Diagnoses and all orders for this visit:    1. Type 2 diabetes mellitus with microalbuminuria, with long-term current use of insulin (HCC)  -     METABOLIC PANEL, BASIC; Future  -     HEMOGLOBIN A1C WITH EAG; Future  -      DIABETES FOOT EXAM   Continue semglee 32 units every day   Low calorie diet  2.  Hypertension, unspecified type  -     METABOLIC PANEL, BASIC; Future   controleld-refill diovan/hct   Hydrate if dizziness occurs  Other orders  - valsartan-hydroCHLOROthiazide (DIOVAN-HCT) 80-12.5 mg per tablet; Take 1 Tablet by mouth daily. Follow-up and Dispositions    · Return in about 5 months (around 12/12/2022) for cpe.

## 2022-07-12 NOTE — PROGRESS NOTES
1. \"Have you been to the ER, urgent care clinic since your last visit? Hospitalized since your last visit? \" No    2. \"Have you seen or consulted any other health care providers outside of the [address] [place] [address] since your last visit? \" No     3. For patients aged 39-70: Has the patient had a colonoscopy / FIT/ Cologuard?  No

## 2022-07-12 NOTE — PATIENT INSTRUCTIONS
Office Policies    Phone calls/patient messages:            Please allow up to 24 hours for someone in the office to contact you about your call or message. Be mindful your provider may be out of the office or your message may require further review. We encourage you to use [employer] for your messages as this is a faster, more efficient way to communicate with our office                         Medication Refills:            Prescription medications require 48-72 business hours to process. We encourage you to use [employer] for your refills. For controlled medications: Please allow 72 business hours to process. Certain medications may require you to  a written prescription at our office. NO narcotic/controlled medications will be prescribed after 4pm Monday through Friday or on weekends              Form/Paperwork Completion:            Please note a $25 fee may incur for all paperwork for completed by our providers. We ask that you allow 7-10 business days. Pre-payment is due prior to picking up/faxing the completed form. You may also download your forms to [employer] to have your doctor print off.

## 2022-07-13 LAB
ANION GAP SERPL CALC-SCNC: 9 MMOL/L (ref 5–15)
BUN SERPL-MCNC: 15 MG/DL (ref 6–20)
BUN/CREAT SERPL: 11 (ref 12–20)
CALCIUM SERPL-MCNC: 9.3 MG/DL (ref 8.5–10.1)
CHLORIDE SERPL-SCNC: 101 MMOL/L (ref 97–108)
CO2 SERPL-SCNC: 25 MMOL/L (ref 21–32)
CREAT SERPL-MCNC: 1.31 MG/DL (ref 0.7–1.3)
EST. AVERAGE GLUCOSE BLD GHB EST-MCNC: 269 MG/DL
GLUCOSE SERPL-MCNC: 396 MG/DL (ref 65–100)
HBA1C MFR BLD: 11 % (ref 4–5.6)
POTASSIUM SERPL-SCNC: 4.4 MMOL/L (ref 3.5–5.1)
SODIUM SERPL-SCNC: 135 MMOL/L (ref 136–145)

## 2022-07-13 NOTE — PROGRESS NOTES
Tell pt diabetes is uncontrolled 3 mos bs avg 269--increase semglee by 5 units to 37 units every day--lower calorie diet.  Advise appt with Pharm D please-needs insulin adjustment and possible additional medication

## 2022-07-13 NOTE — PROGRESS NOTES
Spoke with patient using 2 identifiers. Patient was informed of recent labs and Dr. Mj Zacarias recommendations. Patient is planning to call back to make an appointment with Remy Bourgeois but not now. Patient verbalized understanding.

## 2022-10-07 NOTE — TELEPHONE ENCOUNTER
Patient states he needs refill done thru [facility], [facility] AND [facility] on File/Indicated as his refill thru Mail Order is Not going to arrive in time before he runs out. Please call if any questions or to advise when done.  Thank you

## 2022-10-07 NOTE — TELEPHONE ENCOUNTER
PCP: Matilde Kapoor MD    Last appt: 2022  Future Appointments   Date Time Provider Jessica Russ   2022  9:00 AM Matilde Kapoor MD [facility] BS AMB       Requested Prescriptions     Pending Prescriptions Disp Refills    Semglee,insulin glargine-yfgn, 100 unit/mL soln 5 Each 0     Si Units by SubCUTAneous route daily.

## 2022-10-08 RX ORDER — INSULIN GLARGINE-YFGN 100 [IU]/ML
40 INJECTION, SOLUTION SUBCUTANEOUS DAILY
Qty: 5 EACH | Refills: 0 | Status: SHIPPED | OUTPATIENT
Start: 2022-10-08

## 2022-12-07 ENCOUNTER — TELEPHONE (OUTPATIENT)
Dept: INTERNAL MEDICINE CLINIC | Age: 47
End: 2022-12-07

## 2022-12-07 NOTE — TELEPHONE ENCOUNTER
----- Message from Suhail Falls sent at 12/7/2022  3:59 PM EST -----  Subject: Appointment Request    Reason for Call: Established Patient Appointment needed: Routine Physical   Exam    QUESTIONS    Reason for appointment request? No appointments available during search     Additional Information for Provider? Pt said he wants to know can he be   squeezed in for an appt. Pt said he has a physical for 12/13 but will be   off of work the week of 12/18 and would prefer to come that week.  Please   call pt to let him know if he can change his appt.  ---------------------------------------------------------------------------  --------------  Sydni THOMAS  [phone]; OK to leave message on voicemail  ---------------------------------------------------------------------------  --------------  SCRIPT ANSWERS  COVID Screen: Mattie Muñoz

## 2022-12-18 NOTE — PROGRESS NOTES
HISTORY OF PRESENT ILLNESS  Andrew Wilks is a 49 y.o. male. HPI  Here  for f/u DM-2 with microalbuminuria on insulin obesity and dyslipidemia and CPE  Last a1c 11.0--semglee increased by 5 units every day to 37 units every day, low calorie diet recommended and appt with pharm D-pt did not see pharm d  He takes semglee 40 unitw every day but sometimes runs out  Fsbs 120-130 but onky cehcks a few days per week  Had been running ot insulin only getting 4 vials every 3 months but needs 4 gavin per month  Some fluid in keft eye-will get injections at [facility]  Taking diovan/hct every other day-he thinks it may be causing hair thinning  No cp sob neuoropathy  Nonsmoker   Etoh 3-4 drinks per weekends  Last Cr 1.31  Due for colon screen and several vaccinations      Patient Active Problem List    Diagnosis Date Noted    Dyslipidemia 01/18/2016    Diabetes mellitus ([facility] [address] 09/26/2011     Current Outpatient Medications   Medication Sig Dispense Refill    Semglee,insulin glargine-yfgn, 100 unit/mL soln 40 Units by SubCUTAneous route daily. 5 Each 0    valsartan-hydroCHLOROthiazide (DIOVAN-HCT) 80-12.5 mg per tablet Take 1 Tablet by mouth daily. 90 Tablet 3    tadalafiL (CIALIS) 10 mg tablet TAKE ONE TABLET BY MOUTH DAILY AS NEEDED 8 Tablet 6    Insulin Syringes, Disposable, 1 mL syrg 1 Each by Does Not Apply route daily. 30 Each 1    diclofenac EC (VOLTAREN) 75 mg EC tablet Take 1 Tablet by mouth two (2) times a day. 60 Tablet 0    glucose blood VI test strips (FreeStyle Lite Strips) strip Test ac & hs 100 Strip 3    metFORMIN ER (GLUCOPHAGE XR) 500 mg tablet Take 4 Tabs by mouth daily (with dinner). 360 Tab 3    Blood-Glucose Meter monitoring kit Test before meals and at bedtime. DM 2, insulin dependent.   Length of need: 99. 1 Kit 0    lancets (FREESTYLE LANCETS) 28 gauge misc Test before meals and at bedtime 100 Lancet 11    Insulin Syringe-Needle U-100 (INSULIN SYRINGE) 1 mL 30 gauge x 5/16 syrg 1 Syringe by Does Not Apply route daily. 100 Syringe 3    Aspirin, Buffered 81 mg tab Take  by mouth. Every other day       No Known Allergies  Social History     Tobacco Use    Smoking status: Never    Smokeless tobacco: Never   Substance Use Topics    Alcohol use: Yes     Alcohol/week: 4.2 standard drinks     Types: 5 Cans of beer per week      Lab Results   Component Value Date/Time    WBC 4.9 04/19/2022 09:59 AM    HGB 15.4 04/19/2022 09:59 AM    HCT 47.1 04/19/2022 09:59 AM    PLATELET 376 49/68/3397 09:59 AM    MCV 90.8 04/19/2022 09:59 AM     Lab Results   Component Value Date/Time    Hemoglobin A1c 11.0 (H) 07/12/2022 12:58 PM    Hemoglobin A1c 8.3 (H) 04/19/2022 09:59 AM    Glucose 396 (H) 07/12/2022 12:58 PM    Glucose  11/23/2011 10:54 AM    Microalbumin/Creat ratio (mg/g creat) 69 (H) 04/19/2022 09:59 AM    Microalbumin,urine random 22.70 04/19/2022 09:59 AM    LDL, calculated 98.4 04/19/2022 09:59 AM    Creatinine 1.31 (H) 07/12/2022 12:58 PM      Lab Results   Component Value Date/Time    Cholesterol, total 182 04/19/2022 09:59 AM    Cholesterol (POC) 199 07/01/2014 04:21 PM    HDL Cholesterol 64 04/19/2022 09:59 AM    LDL, calculated 98.4 04/19/2022 09:59 AM    LDL Cholesterol (POC) 121 07/01/2014 04:21 PM    Triglyceride 98 04/19/2022 09:59 AM    Triglycerides (POC) 171 07/01/2014 04:21 PM    CHOL/HDL Ratio 2.8 04/19/2022 09:59 AM     Lab Results   Component Value Date/Time    GFR est non-AA 59 (L) 07/12/2022 12:58 PM    GFR est AA >60 07/12/2022 12:58 PM    Creatinine 1.31 (H) 07/12/2022 12:58 PM    BUN 15 07/12/2022 12:58 PM    Sodium 135 (L) 07/12/2022 12:58 PM    Potassium 4.4 07/12/2022 12:58 PM    Chloride 101 07/12/2022 12:58 PM    CO2 25 07/12/2022 12:58 PM     Lab Results   Component Value Date/Time    Glucose 396 (H) 07/12/2022 12:58 PM    Glucose  11/23/2011 10:54 AM         ROS    Physical Exam  Vitals and nursing note reviewed. Constitutional:       General: He is not in acute distress. Appearance: Normal appearance. He is well-developed. Comments: Appears stated age   HENT:      Head: Normocephalic and atraumatic. Right Ear: Tympanic membrane normal.      Left Ear: Tympanic membrane normal.   Cardiovascular:      Rate and Rhythm: Normal rate and regular rhythm. Heart sounds: Normal heart sounds. Pulmonary:      Effort: Pulmonary effort is normal.      Breath sounds: Normal breath sounds. Abdominal:      Palpations: Abdomen is soft. Musculoskeletal:      Cervical back: Neck supple. Right lower leg: No edema. Left lower leg: No edema. Neurological:      General: No focal deficit present. Mental Status: He is alert. Psychiatric:         Mood and Affect: Mood normal.         Behavior: Behavior normal.         Thought Content: Thought content normal.         Judgment: Judgment normal.     ASSESSMENT and PLAN    ICD-10-CM ICD-9-CM    1. Type 2 diabetes mellitus with microalbuminuria, with long-term current use of insulin ([facility])  E11.29 250.40 HEMOGLOBIN A1C WITH EAG    B14.6 741.7 METABOLIC PANEL, COMPREHENSIVE    Z79.4 V58.67 CBC W/O DIFF  Refill insulin 12 vials with 3 refills  Refer to pharm D for CGM and possible weekly GLP1      2. Dyslipidemia  U23.7 688.1 METABOLIC PANEL, COMPREHENSIVE      LIPID PANEL      TSH 3RD GENERATION      CBC W/O DIFF  LDL at goal      3. Colon cancer screening  Z12.11 V76.51 REFERRAL TO GASTROENTEROLOGY      4. Routine general medical examination at a health care facility  L79.78 V44.7 METABOLIC PANEL, COMPREHENSIVE      TSH 3RD GENERATION      CBC W/O DIFF      PSA W/ REFLX FREE PSA      REFERRAL TO GASTROENTEROLOGY      5. Encounter for immunization  Z23 V03.89 PNEUMOCOCCAL, PCV-13, (AGE 6 WKS+), IM      6. Needs flu shot  Z23 V04.81 INFLUENZA, FLUARIX, FLULAVAL, FLUZONE (AGE 6 MO+), AFLURIA(AGE 3Y+) IM, PF, 0.5 ML   7.        HTN--increase diovan/hct 160/25 mg every day--stressed med adherence  8        Abnormal renal function---labs today-no nsaids d/w pt  9 RTC 4 months DM-2 and HTN  See pharm D next month

## 2022-12-21 ENCOUNTER — OFFICE VISIT (OUTPATIENT)
Dept: INTERNAL MEDICINE CLINIC | Age: 47
End: 2022-12-21
Payer: COMMERCIAL

## 2022-12-21 VITALS
TEMPERATURE: 97.3 F | HEART RATE: 76 BPM | BODY MASS INDEX: 33.64 KG/M2 | OXYGEN SATURATION: 98 % | WEIGHT: 253.8 LBS | SYSTOLIC BLOOD PRESSURE: 166 MMHG | HEIGHT: 73 IN | DIASTOLIC BLOOD PRESSURE: 90 MMHG | RESPIRATION RATE: 18 BRPM

## 2022-12-21 DIAGNOSIS — E11.29 TYPE 2 DIABETES MELLITUS WITH MICROALBUMINURIA, WITH LONG-TERM CURRENT USE OF INSULIN (HCC): Primary | ICD-10-CM

## 2022-12-21 DIAGNOSIS — Z23 ENCOUNTER FOR IMMUNIZATION: ICD-10-CM

## 2022-12-21 DIAGNOSIS — I10 HYPERTENSION, UNSPECIFIED TYPE: ICD-10-CM

## 2022-12-21 DIAGNOSIS — Z79.4 TYPE 2 DIABETES MELLITUS WITH MICROALBUMINURIA, WITH LONG-TERM CURRENT USE OF INSULIN (HCC): Primary | ICD-10-CM

## 2022-12-21 DIAGNOSIS — Z12.11 COLON CANCER SCREENING: ICD-10-CM

## 2022-12-21 DIAGNOSIS — R80.9 TYPE 2 DIABETES MELLITUS WITH MICROALBUMINURIA, WITH LONG-TERM CURRENT USE OF INSULIN (HCC): Primary | ICD-10-CM

## 2022-12-21 DIAGNOSIS — Z00.00 ROUTINE GENERAL MEDICAL EXAMINATION AT A HEALTH CARE FACILITY: ICD-10-CM

## 2022-12-21 DIAGNOSIS — Z23 NEEDS FLU SHOT: ICD-10-CM

## 2022-12-21 DIAGNOSIS — E78.5 DYSLIPIDEMIA: ICD-10-CM

## 2022-12-21 LAB
ALBUMIN SERPL-MCNC: 4.3 G/DL (ref 3.5–5)
ALBUMIN/GLOB SERPL: 1.4 {RATIO} (ref 1.1–2.2)
ALP SERPL-CCNC: 134 U/L (ref 45–117)
ALT SERPL-CCNC: 44 U/L (ref 12–78)
ANION GAP SERPL CALC-SCNC: 6 MMOL/L (ref 5–15)
AST SERPL-CCNC: 25 U/L (ref 15–37)
BILIRUB SERPL-MCNC: 0.5 MG/DL (ref 0.2–1)
BUN SERPL-MCNC: 14 MG/DL (ref 6–20)
BUN/CREAT SERPL: 13 (ref 12–20)
CALCIUM SERPL-MCNC: 9.2 MG/DL (ref 8.5–10.1)
CHLORIDE SERPL-SCNC: 106 MMOL/L (ref 97–108)
CHOLEST SERPL-MCNC: 173 MG/DL
CO2 SERPL-SCNC: 26 MMOL/L (ref 21–32)
CREAT SERPL-MCNC: 1.1 MG/DL (ref 0.7–1.3)
ERYTHROCYTE [DISTWIDTH] IN BLOOD BY AUTOMATED COUNT: 12.2 % (ref 11.5–14.5)
EST. AVERAGE GLUCOSE BLD GHB EST-MCNC: 229 MG/DL
GLOBULIN SER CALC-MCNC: 3 G/DL (ref 2–4)
GLUCOSE SERPL-MCNC: 235 MG/DL (ref 65–100)
HBA1C MFR BLD: 9.6 % (ref 4–5.6)
HCT VFR BLD AUTO: 42.8 % (ref 36.6–50.3)
HDLC SERPL-MCNC: 55 MG/DL
HDLC SERPL: 3.1 {RATIO} (ref 0–5)
HGB BLD-MCNC: 14.5 G/DL (ref 12.1–17)
LDLC SERPL CALC-MCNC: 96.8 MG/DL (ref 0–100)
MCH RBC QN AUTO: 29.8 PG (ref 26–34)
MCHC RBC AUTO-ENTMCNC: 33.9 G/DL (ref 30–36.5)
MCV RBC AUTO: 87.9 FL (ref 80–99)
NRBC # BLD: 0 K/UL (ref 0–0.01)
NRBC BLD-RTO: 0 PER 100 WBC
PLATELET # BLD AUTO: 217 K/UL (ref 150–400)
PMV BLD AUTO: 12 FL (ref 8.9–12.9)
POTASSIUM SERPL-SCNC: 3.9 MMOL/L (ref 3.5–5.1)
PROT SERPL-MCNC: 7.3 G/DL (ref 6.4–8.2)
RBC # BLD AUTO: 4.87 M/UL (ref 4.1–5.7)
SODIUM SERPL-SCNC: 138 MMOL/L (ref 136–145)
TRIGL SERPL-MCNC: 106 MG/DL (ref ?–150)
TSH SERPL DL<=0.05 MIU/L-ACNC: 0.92 UIU/ML (ref 0.36–3.74)
VLDLC SERPL CALC-MCNC: 21.2 MG/DL
WBC # BLD AUTO: 5.3 K/UL (ref 4.1–11.1)

## 2022-12-21 RX ORDER — AMLODIPINE BESYLATE 5 MG/1
5 TABLET ORAL DAILY
Qty: 90 TABLET | Refills: 3 | Status: SHIPPED | OUTPATIENT
Start: 2022-12-21 | End: 2022-12-21 | Stop reason: CLARIF

## 2022-12-21 RX ORDER — VALSARTAN AND HYDROCHLOROTHIAZIDE 160; 25 MG/1; MG/1
1 TABLET ORAL DAILY
Qty: 90 TABLET | Refills: 3 | Status: SHIPPED | OUTPATIENT
Start: 2022-12-21

## 2022-12-21 RX ORDER — VALSARTAN AND HYDROCHLOROTHIAZIDE 320; 25 MG/1; MG/1
1 TABLET, FILM COATED ORAL DAILY
Qty: 90 TABLET | Refills: 3 | Status: SHIPPED | OUTPATIENT
Start: 2022-12-21 | End: 2022-12-21 | Stop reason: DRUGHIGH

## 2022-12-21 RX ORDER — INSULIN GLARGINE-YFGN 100 [IU]/ML
40 INJECTION, SOLUTION SUBCUTANEOUS DAILY
Qty: 12 EACH | Refills: 3 | Status: SHIPPED | OUTPATIENT
Start: 2022-12-21

## 2022-12-21 NOTE — PROGRESS NOTES
1. Have you been to the ER, urgent care clinic since your last visit? Hospitalized since your last visit? No    2. Have you seen or consulted any other health care providers outside of the [address] [place] [address] since your last visit? Include any pap smears or colon screening.      [facility] Dr. Dagmar Giles and Dr. Shana Figueroa

## 2022-12-23 LAB
PSA SERPL-MCNC: 0.6 NG/ML (ref 0–4)
REFLEX CRITERIA: NORMAL

## 2023-01-13 NOTE — PROGRESS NOTES
Pharmacy Progress Note - Diabetes Management      Assessment / Plan:   Diabetes Management:  - Per ADA guidelines, Pt's A1c is not at goal of < 7%. BP remains elevated for goal <130/80.   - Discuss concern regarding persistent elevated hyperglycemia.   - Continue Semglee 40 units daily for now. Will send in rx for insulin pen and provide mfr coupon to alleviate copay burden.   - Recommend for patient to split metformin to 1 gm BID   - Discuss alcohol effects on hyper/hypoglycemia. Encourage patient to reduce intake - particularly with concomitant soda intake. - Recommend for patient to reduce total sodium intake. S/O: Mr. Ashli Leigh is a 49 y.o. male, referred by Dr. Krystian Ashford MD, with a PMH of T2DM, HLD, HTN, was seen today for diabetes management. Patient's last A1c was 9.6% (Dec 2022), 11% (July 2022), 8.3% (April 2022), 8.8% (April 2018), 14.6% (Jan 2016) . Interim update:   Saw Dr Kimberlyn Cornell for f/up in Dec.     SHx:  - Has two sons - 8 and 17 YO  - Works for [employer] - 10:30 PM Mon-Saturday    Medication Adherence/Access:  - Brought in home medications including prescription/non-prescriptions:  no  - Endorses barriers to affording/accessing medications : yes  - Uses a pill box/other methods to organize medications: no  - Endorses adherence to current regimen?: yes  - Uses [facility] mail order and [facility] ; Rx insurance is: unsure    Diabetes Management:  Diabetes History:  - Endorses dx ~2010 - was symptomatic and identified during regular f/up visit  - Family hx: maternal grandmother and mother had T2DM     - Previous anti-hyperglycemic agents includes:  Hx of Lantus and Levemir pens    Current anti-hyperglycemic regimen includes:    - Metformin 500 mg ER - 2 gm daily with dinner --- reports to taking 3 tabs/day \"taking throughout the day\"  - Semglee vial 40 units daily AM - transitioned to [employer] formulary.  Prefers insulin pen. $60 copay    - ASA 81 mg daily,   - Valsartan/HCTZ 160/25 mg daily  - LDL 96 ;  (Dec 2022)    Lab Results   Component Value Date/Time    Microalbumin/Creat ratio (mg/g creat) 69 (H) 04/19/2022 09:59 AM    Microalbumin,urine random 22.70 04/19/2022 09:59 AM     ROS:  Today, Pt endorses:  - Symptoms of Hyperglycemia: polyuria and blurry vision  - Symptoms of Hypoglycemia: none    Blood Glucose Monitoring (BGM) or CGM:  Does not monitor BG regularly  Unsure of testing supplies are in date. Lifestyle modification(s):  Does not smoke  Drinks alcohol on the weekends --- usually 6-7 drinks of coke & jack merchant or vodka and ginger ale  Occasional fruit juice. Denies affiliation for other sweets. May add salt to current dietary choices     Followed by Dr Altaf Vallejo (ophthalmology - [facility]) - reports he has L eye injection later this week d/t elevated intraocular pressure. The 10-year ASCVD risk score (Adwoa DK, et al., 2019) is: 19.1%    Values used to calculate the score:      Age: 49 years      Sex: Male      Is Non- : Yes      Diabetic: Yes      Tobacco smoker: No      Systolic Blood Pressure: 721 mmHg      Is BP treated: Yes      HDL Cholesterol: 55 MG/DL      Total Cholesterol: 173 MG/DL    Vitals: Wt Readings from Last 3 Encounters:   01/17/23 250 lb (113.4 kg)   12/21/22 253 lb 12.8 oz (115.1 kg)   07/12/22 249 lb (112.9 kg)     BP Readings from Last 3 Encounters:   01/17/23 (!) 155/90   12/21/22 (!) 166/90   07/12/22 128/82     Pulse Readings from Last 3 Encounters:   01/17/23 80   12/21/22 76   07/12/22 74       Past Medical History:   Diagnosis Date    Diabetes ([facility] [address]      No Known Allergies    Current Outpatient Medications   Medication Sig    Semglee,insulin glargine-yfgn, 100 unit/mL soln 40 Units by SubCUTAneous route daily. valsartan-hydroCHLOROthiazide (DIOVAN-HCT) 160-25 mg per tablet Take 1 Tablet by mouth daily.     tadalafiL (CIALIS) 10 mg tablet TAKE ONE TABLET BY MOUTH DAILY AS NEEDED    Insulin Syringes, Disposable, 1 mL syrg 1 Each by Does Not Apply route daily. glucose blood VI test strips (FreeStyle Lite Strips) strip Test ac & hs    metFORMIN ER (GLUCOPHAGE XR) 500 mg tablet Take 4 Tabs by mouth daily (with dinner). Blood-Glucose Meter monitoring kit Test before meals and at bedtime. DM 2, insulin dependent. Length of need: 99.    lancets (FREESTYLE LANCETS) 28 gauge misc Test before meals and at bedtime    Insulin Syringe-Needle U-100 (INSULIN SYRINGE) 1 mL 30 gauge x 5/16 syrg 1 Syringe by Does Not Apply route daily. Aspirin, Buffered 81 mg tab Take  by mouth. Every other day     No current facility-administered medications for this visit. Lab Results   Component Value Date/Time    Sodium 138 12/21/2022 10:27 AM    Potassium 3.9 12/21/2022 10:27 AM    Chloride 106 12/21/2022 10:27 AM    CO2 26 12/21/2022 10:27 AM    Anion gap 6 12/21/2022 10:27 AM    Glucose 235 (H) 12/21/2022 10:27 AM    BUN 14 12/21/2022 10:27 AM    Creatinine 1.10 12/21/2022 10:27 AM    BUN/Creatinine ratio 13 12/21/2022 10:27 AM    GFR est AA >60 07/12/2022 12:58 PM    GFR est non-AA 59 (L) 07/12/2022 12:58 PM    Calcium 9.2 12/21/2022 10:27 AM    Bilirubin, total 0.5 12/21/2022 10:27 AM    Alk.  phosphatase 134 (H) 12/21/2022 10:27 AM    Protein, total 7.3 12/21/2022 10:27 AM    Albumin 4.3 12/21/2022 10:27 AM    Globulin 3.0 12/21/2022 10:27 AM    A-G Ratio 1.4 12/21/2022 10:27 AM    ALT (SGPT) 44 12/21/2022 10:27 AM     Lab Results   Component Value Date/Time    Cholesterol, total 173 12/21/2022 10:27 AM    Cholesterol (POC) 199 07/01/2014 04:21 PM    HDL Cholesterol 55 12/21/2022 10:27 AM    HDL Cholesterol (POC) 45 07/01/2014 04:21 PM    LDL Cholesterol (POC) 121 07/01/2014 04:21 PM    LDL, calculated 96.8 12/21/2022 10:27 AM    VLDL, calculated 21.2 12/21/2022 10:27 AM    Triglyceride 106 12/21/2022 10:27 AM    Triglycerides (POC) 171 07/01/2014 04:21 PM    CHOL/HDL Ratio 3.1 12/21/2022 10:27 AM     Lab Results   Component Value Date/Time    WBC 5.3 2022 10:27 AM    HGB 14.5 2022 10:27 AM    HCT 42.8 2022 10:27 AM    PLATELET 660  10:27 AM    MCV 87.9 2022 10:27 AM       Lab Results   Component Value Date/Time    Microalbumin/Creat ratio (mg/g creat) 69 (H) 2022 09:59 AM    Microalbumin,urine random 22.70 2022 09:59 AM       Lab Results   Component Value Date/Time    Hemoglobin A1c 9.6 (H) 2022 10:27 AM    Hemoglobin A1c 11.0 (H) 2022 12:58 PM    Hemoglobin A1c 8.3 (H) 2022 09:59 AM     Hemoglobin A1c (POC)   Date Value Ref Range Status   2018 8.8 (A) 4.8 - 5.6 % Final        Estimated Creatinine Clearance: 108.4 mL/min (by C-G formula based on SCr of 1.1 mg/dL). Medication reconciliation was completed during the visit. Medications Discontinued During This Encounter   Medication Reason    Aspirin, Buffered 81 mg tab LIST CLEANUP    Insulin Syringe-Needle U-100 (INSULIN SYRINGE) 1 mL 30 gauge x  syrg DUPLICATE ORDER    Insulin Syringes, Disposable, 1 mL syrg DUPLICATE ORDER    metFORMIN ER (GLUCOPHAGE XR) 500 mg tablet REORDER     Orders Placed This Encounter    aspirin delayed-release 81 mg tablet     Sig: Take 81 mg by mouth daily. insulin glargine-yfgn (Semglee,insulin glarg-yfgn,Pen) 100 unit/mL (3 mL) inpn     Si Units by SubCUTAneous route daily. Indications: type 2 diabetes mellitus     Dispense:  15 mL     Refill:  2     Replaces vial    metFORMIN ER (GLUCOPHAGE XR) 500 mg tablet     Sig: Take 2 Tablets by mouth two (2) times a day. Dispense:  360 Tablet     Refill:  3    flash glucose sensor (FreeStyle Romeo 2 Sensor) kit     Si Each by Does Not Apply route See Admin Instructions. Apply and replace sensor every 14 days. Scan sensor at least 4 times daily.  E11.65     Dispense:  2 Kit     Refill:  11       Patient verbalized understanding of the information presented and all of the patients questions were answered. AVS was handed to the patient. Patient advised to call the office with any additional questions or concerns. Notifications of recommendations will be sent to Dr. Dom Hendrix MD for review. Patient will return to clinic in 3 week(s) for follow up.      Thank you for the consult,  Yeimi Brown, PharmD, BCACP, [facility] Only    Program: Medical Group  CPA in place: Yes  Recommendation Provided To: Patient/Caregiver: 11 via In person  Intervention Detail: Adherence Monitorin, Discontinued Rx: 4, reason: Therapy Complete, New Rx: 4, reason: Cost/Formulary Change, Improve Adherence, and Patient Preference, Patient Access Assistance/Sample Provided, and Scheduled Appointment  Intervention Accepted By: Patient/Caregiver: 11  Time Spent (min): 60

## 2023-01-17 ENCOUNTER — OFFICE VISIT (OUTPATIENT)
Dept: INTERNAL MEDICINE CLINIC | Age: 48
End: 2023-01-17

## 2023-01-17 VITALS
SYSTOLIC BLOOD PRESSURE: 155 MMHG | BODY MASS INDEX: 33.13 KG/M2 | DIASTOLIC BLOOD PRESSURE: 90 MMHG | WEIGHT: 250 LBS | HEART RATE: 80 BPM | HEIGHT: 73 IN

## 2023-01-17 DIAGNOSIS — Z79.4 TYPE 2 DIABETES MELLITUS WITH MICROALBUMINURIA, WITH LONG-TERM CURRENT USE OF INSULIN (HCC): Primary | ICD-10-CM

## 2023-01-17 DIAGNOSIS — R80.9 TYPE 2 DIABETES MELLITUS WITH MICROALBUMINURIA, WITH LONG-TERM CURRENT USE OF INSULIN (HCC): Primary | ICD-10-CM

## 2023-01-17 DIAGNOSIS — E11.29 TYPE 2 DIABETES MELLITUS WITH MICROALBUMINURIA, WITH LONG-TERM CURRENT USE OF INSULIN (HCC): Primary | ICD-10-CM

## 2023-01-17 RX ORDER — INSULIN GLARGINE-YFGN 100 [IU]/ML
40 INJECTION, SOLUTION SUBCUTANEOUS DAILY
Qty: 15 ML | Refills: 2 | Status: SHIPPED | OUTPATIENT
Start: 2023-01-17

## 2023-01-17 RX ORDER — METFORMIN HYDROCHLORIDE 500 MG/1
1000 TABLET, EXTENDED RELEASE ORAL 2 TIMES DAILY
Qty: 360 TABLET | Refills: 3 | Status: SHIPPED | OUTPATIENT
Start: 2023-01-17

## 2023-01-17 RX ORDER — ASPIRIN 81 MG/1
81 TABLET ORAL DAILY
COMMUNITY

## 2023-01-17 RX ORDER — FLASH GLUCOSE SENSOR
1 KIT MISCELLANEOUS SEE ADMIN INSTRUCTIONS
Qty: 2 KIT | Refills: 11 | Status: SHIPPED | OUTPATIENT
Start: 2023-01-17

## 2023-01-17 NOTE — PATIENT INSTRUCTIONS
Check your test strip expiration date. Let's see if we can get the Patel covered  Take your metformin 500 mg ER - two tablets twice daily  Continue Semglee 40 units daily. Share card with   Avoid sodas  Avoid addition of salt to your foods    Check and document your blood sugar first thing in the morning (fasting) and before bedtime. Bring your meter/log to all future visits. Your blood sugar goals:  - Fasting (first thing in the morning)  blood sugar: 80 - 130   - 1 to 2 hours after a meal: less than 180     When you experience symptoms of low blood sugar (example: less than 70):  - Confirm low reading by checking your blood sugar.   - Then treat with 15 grams of carbohydrates (one-half cup of juice or regular soda, or 4-5 glucose tablets). - Wait 15 minutes to recheck blood sugar.   - Then eat a protein containing meal/snack to prevent another low blood sugar episode. (example: peanut butter + crackers)    Other recommendations:  - Schedule an annual eye exam.  - Check your feet daily for any signs of open wounds, cuts, or sores. - Given your risk factors, the following vaccines are recommended: annual flu shot, age-based pneumococcal vaccines (Pneumovax, Prevnar 13). In addition to taking your medications as directed, improving your blood sugar involves modifying your nutrition and maximizing the amount of physical activity.

## 2023-01-21 RX ORDER — PEN NEEDLE, DIABETIC 29 G X1/2"
NEEDLE, DISPOSABLE MISCELLANEOUS
Qty: 30 EACH | Refills: 5 | Status: SHIPPED | OUTPATIENT
Start: 2023-01-21

## 2023-02-08 DIAGNOSIS — Z79.4 TYPE 2 DIABETES MELLITUS WITHOUT COMPLICATION, WITH LONG-TERM CURRENT USE OF INSULIN (HCC): ICD-10-CM

## 2023-02-08 DIAGNOSIS — E11.9 TYPE 2 DIABETES MELLITUS WITHOUT COMPLICATION, WITH LONG-TERM CURRENT USE OF INSULIN (HCC): ICD-10-CM

## 2023-02-08 RX ORDER — BLOOD-GLUCOSE METER
KIT MISCELLANEOUS
Qty: 100 STRIP | Refills: 3 | Status: SHIPPED | OUTPATIENT
Start: 2023-02-08

## 2023-02-21 NOTE — PROGRESS NOTES
Pharmacy Progress Note - Diabetes Management    Assessment / Plan:   Diabetes Management:  - Per ADA guidelines, Pt's A1c is not at goal of < 7%. - Available BG remains elevated for goal - consistent with sx hyperglycemia complaints. Glucometer setting corrected today. Recommend for patient to check BG at least twice daily.   - Increase Semglee to 48 units daily  - Continue metformin 1 gm BID    HTN:   - BP remains elevated for goal <130/80  - Start amlodipine 5 mg daily. - Continue valsartan/HCTZ 160/25 mg daily      S/O: Mr. Marcie Sow is a 49 y.o. male, referred by Dr. John Moy MD, with a PMH of T2DM, HLD, HTN, was seen today for diabetes management follow up. Patient's last A1c was 9.6% (Dec 2022), 11% (July 2022), 8.3% (April 2022), 8.8% (April 2018), 14.6% (Jan 2016) . Patient was seen today together with Talia Martinez PharmD Candidate 2023. I have read and agree with [facility]'s documentation. Current anti-hyperglycemic regimen include(s):    - Metformin 500 mg ER - takes 1 gm BID  - Semglee vial 40 units daily --- still using up vial supply     - ASA 81 mg daily,   - Valsartan/HCTZ 160/25 mg daily  - LDL 96 ;  (Dec 2022)    ROS:  Today, Pt endorses:  - Symptoms of Hyperglycemia: polyuria  - Symptoms of Hypoglycemia: none    Denies HA/CP    Blood Glucose Monitoring (BGM) or CGM:  Brought in Freestyle Lite meter. Date & time settings are off  Checking only once daily   Fasting today: 265  Others: 401,403,371,391,355 -    Nutrition/Lifestyle Modifications: Wt stable  Dinner last night: burger, fries, and regular sweet tea     Vitals:   Wt Readings from Last 3 Encounters:   02/22/23 251 lb (113.9 kg)   01/17/23 250 lb (113.4 kg)   12/21/22 253 lb 12.8 oz (115.1 kg)     BP Readings from Last 3 Encounters:   02/22/23 (!) 152/100   01/17/23 (!) 155/90   12/21/22 (!) 166/90     Pulse Readings from Last 3 Encounters:   02/22/23 75   01/17/23 80   12/21/22 76       Past Medical History:   Diagnosis Date    Diabetes ([facility] [address]      No Known Allergies    Current Outpatient Medications   Medication Sig    glucose blood VI test strips (FreeStyle Lite Strips) strip Test ac & hs    BD Insulin Syringe Ultra-Fine 1 mL 30 gauge x 1/2\" syrg USE AS DIRECTED    aspirin delayed-release 81 mg tablet Take 81 mg by mouth daily. insulin glargine-yfgn (Semglee,insulin glarg-yfgn,Pen) 100 unit/mL (3 mL) inpn 40 Units by SubCUTAneous route daily. Indications: type 2 diabetes mellitus    metFORMIN ER (GLUCOPHAGE XR) 500 mg tablet Take 2 Tablets by mouth two (2) times a day. flash glucose sensor (FreeStyle Romeo 2 Sensor) kit 1 Each by Does Not Apply route See Natalia Tarango. Apply and replace sensor every 14 days. Scan sensor at least 4 times daily. E11.65    Semglee,insulin glargine-yfgn, 100 unit/mL soln 40 Units by SubCUTAneous route daily. valsartan-hydroCHLOROthiazide (DIOVAN-HCT) 160-25 mg per tablet Take 1 Tablet by mouth daily. tadalafiL (CIALIS) 10 mg tablet TAKE ONE TABLET BY MOUTH DAILY AS NEEDED    Blood-Glucose Meter monitoring kit Test before meals and at bedtime. DM 2, insulin dependent. Length of need: 99.    lancets (FREESTYLE LANCETS) 28 gauge misc Test before meals and at bedtime     No current facility-administered medications for this visit. Lab Results   Component Value Date/Time    Sodium 138 12/21/2022 10:27 AM    Potassium 3.9 12/21/2022 10:27 AM    Chloride 106 12/21/2022 10:27 AM    CO2 26 12/21/2022 10:27 AM    Anion gap 6 12/21/2022 10:27 AM    Glucose 235 (H) 12/21/2022 10:27 AM    BUN 14 12/21/2022 10:27 AM    Creatinine 1.10 12/21/2022 10:27 AM    BUN/Creatinine ratio 13 12/21/2022 10:27 AM    GFR est AA >60 07/12/2022 12:58 PM    GFR est non-AA 59 (L) 07/12/2022 12:58 PM    Calcium 9.2 12/21/2022 10:27 AM    Bilirubin, total 0.5 12/21/2022 10:27 AM    Alk.  phosphatase 134 (H) 12/21/2022 10:27 AM    Protein, total 7.3 12/21/2022 10:27 AM    Albumin 4.3 12/21/2022 10:27 AM    Globulin 3.0 12/21/2022 10:27 AM    A-G Ratio 1.4 12/21/2022 10:27 AM    ALT (SGPT) 44 12/21/2022 10:27 AM       Lab Results   Component Value Date/Time    Cholesterol, total 173 12/21/2022 10:27 AM    Cholesterol (POC) 199 07/01/2014 04:21 PM    HDL Cholesterol 55 12/21/2022 10:27 AM    HDL Cholesterol (POC) 45 07/01/2014 04:21 PM    LDL Cholesterol (POC) 121 07/01/2014 04:21 PM    LDL, calculated 96.8 12/21/2022 10:27 AM    VLDL, calculated 21.2 12/21/2022 10:27 AM    Triglyceride 106 12/21/2022 10:27 AM    Triglycerides (POC) 171 07/01/2014 04:21 PM    CHOL/HDL Ratio 3.1 12/21/2022 10:27 AM       Lab Results   Component Value Date/Time    WBC 5.3 12/21/2022 10:27 AM    HGB 14.5 12/21/2022 10:27 AM    HCT 42.8 12/21/2022 10:27 AM    PLATELET 075 22/10/2755 10:27 AM    MCV 87.9 12/21/2022 10:27 AM       Lab Results   Component Value Date/Time    Microalbumin/Creat ratio (mg/g creat) 69 (H) 04/19/2022 09:59 AM    Microalbumin,urine random 22.70 04/19/2022 09:59 AM       HbA1c:  Lab Results   Component Value Date/Time    Hemoglobin A1c 9.6 (H) 12/21/2022 10:27 AM    Hemoglobin A1c (POC) 8.8 (A) 04/02/2018 09:35 AM     No components found for: 2     Last Point of Care HGB A1C  Hemoglobin A1c (POC)   Date Value Ref Range Status   04/02/2018 8.8 (A) 4.8 - 5.6 % Final        Estimated Creatinine Clearance: 108.6 mL/min (by C-G formula based on SCr of 1.1 mg/dL). Medication reconciliation was completed during the visit. There are no discontinued medications. Orders Placed This Encounter    amLODIPine (NORVASC) 5 mg tablet     Sig: Take 1 Tablet by mouth daily. Dispense:  30 Tablet     Refill:  1     Patient verbalized understanding of the information presented and all of the patients questions were answered. AVS was handed to the patient. Patient advised to call the office with any additional questions or concerns.     Notifications of recommendations will be sent to Dr. Amberly Braden MD for review. Patient will return to clinic in 4 week(s) for follow up.      Thank you for the consult,  Yeimi Machuca, PharmD, BCACP, [address] [facility]    Program: Medical Group  CPA in place: Yes  Recommendation Provided To: Patient/Caregiver: 4 via In person  Intervention Detail: Device Training, Dose Adjustment: 1, reason: Therapy Optimization, New Rx: 1, reason: Needs Additional Therapy, and Scheduled Appointment  Intervention Accepted By: Patient/Caregiver: 4  Gap Closed?:   Time Spent (min): 45

## 2023-02-22 ENCOUNTER — OFFICE VISIT (OUTPATIENT)
Dept: INTERNAL MEDICINE CLINIC | Age: 48
End: 2023-02-22

## 2023-02-22 VITALS
HEART RATE: 75 BPM | WEIGHT: 251 LBS | DIASTOLIC BLOOD PRESSURE: 100 MMHG | HEIGHT: 73 IN | BODY MASS INDEX: 33.27 KG/M2 | SYSTOLIC BLOOD PRESSURE: 152 MMHG

## 2023-02-22 DIAGNOSIS — Z79.4 TYPE 2 DIABETES MELLITUS WITH MICROALBUMINURIA, WITH LONG-TERM CURRENT USE OF INSULIN (HCC): Primary | ICD-10-CM

## 2023-02-22 DIAGNOSIS — R80.9 TYPE 2 DIABETES MELLITUS WITH MICROALBUMINURIA, WITH LONG-TERM CURRENT USE OF INSULIN (HCC): Primary | ICD-10-CM

## 2023-02-22 DIAGNOSIS — E11.29 TYPE 2 DIABETES MELLITUS WITH MICROALBUMINURIA, WITH LONG-TERM CURRENT USE OF INSULIN (HCC): Primary | ICD-10-CM

## 2023-02-22 RX ORDER — AMLODIPINE BESYLATE 5 MG/1
5 TABLET ORAL DAILY
Qty: 30 TABLET | Refills: 1 | Status: SHIPPED | OUTPATIENT
Start: 2023-02-22

## 2023-02-22 NOTE — PATIENT INSTRUCTIONS
Increase Semglee insulin to 48 units daily. Continue metformin 1000 mg twice daily  Start amlodipine 5 mg daily --- this is a new blood pressure medication  Continue valsartan/HCTZ 160/25 mg daily  Check fasting and afternoon blood sugar readings. Bring meter to the next visit  Avoid sodas, sweet tea, juices.  Drink more water

## 2023-02-22 NOTE — PROGRESS NOTES
Pharmacy Progress Note - Diabetes Management    Assessment / Plan:   Diabetes Management:  - Per ADA guidelines, Pt's A1c is not at goal of < 7%. - Increase Semglee to 48 units daily  - Continue Metformin 1 g BID  - Continue to bring in glucometer to all future visits and check BG twice daily - fasting and afternoon BG  - Encouraged patient to avoid sodas, sweet teas, and fruit juices as it relates to diabetes and sugar intake. Try to substitute for water if possible    HTN Management:  - Per ACC/AHA guidelines, Pt's blood pressure is not at goal of < 130/80  - Start Amlodipine 5 mg daily  - Continue Valsartan/HCTZ 160/25 mg daily       S/O: Mr. Estefany Mora is a 49 y.o. male, referred by Dr. Kena Carballo MD, with a PMH of T2DM, HLD, HTN, was seen today for diabetes management. Patient's last A1c was 9.6% (Dec 2022), 11% (July 2022), 8.3% (April 2022), 8.8% (April 2018), 14.6% (Jan 2016) .      Interim update:   Denies any s/sx of hypoglycemia  Reports symptoms of hyperglycemia (nocturia - 2x a night)  Reports missing a few doses of Metformin  Checked BG in clinic: 265 (fasting), but taken insulin and metformin this morning      Current anti-hyperglycemic regimen include(s):    - Metformin 500 mg ER - Take 1 g BID  - Semglee (vial) 40 units daily *currently still using vial supply*    - Valsartan/HCTZ 160/25 mg daily      ROS:  Today, Pt endorses:  - Symptoms of Hyperglycemia: none  - Symptoms of Hypoglycemia: none        Nutrition/Lifestyle Modifications:  - Reports having 2 meals a day (breakfast, dinner)  - Consistent with previous visit  - Reports work keeps him busy and does a lot of walking at work      The 10-year ASCVD risk score (Adwoa JULIO, et al., 2019) is: 18.5%    Values used to calculate the score:      Age: 49 years      Sex: Male      Is Non- : Yes      Diabetic: Yes      Tobacco smoker: No      Systolic Blood Pressure: 811 mmHg      Is BP treated: Yes      HDL Cholesterol: 55 MG/DL      Total Cholesterol: 173 MG/DL     Vitals: Wt Readings from Last 3 Encounters:   02/22/23 251 lb (113.9 kg)   01/17/23 250 lb (113.4 kg)   12/21/22 253 lb 12.8 oz (115.1 kg)     BP Readings from Last 3 Encounters:   02/22/23 (!) 152/100   01/17/23 (!) 155/90   12/21/22 (!) 166/90     Pulse Readings from Last 3 Encounters:   02/22/23 75   01/17/23 80   12/21/22 76       Past Medical History:   Diagnosis Date    Diabetes ([facility] [address]      No Known Allergies    Current Outpatient Medications   Medication Sig    amLODIPine (NORVASC) 5 mg tablet Take 1 Tablet by mouth daily. glucose blood VI test strips (FreeStyle Lite Strips) strip Test ac & hs    BD Insulin Syringe Ultra-Fine 1 mL 30 gauge x 1/2\" syrg USE AS DIRECTED    aspirin delayed-release 81 mg tablet Take 81 mg by mouth daily. insulin glargine-yfgn (Semglee,insulin glarg-yfgn,Pen) 100 unit/mL (3 mL) inpn 40 Units by SubCUTAneous route daily. Indications: type 2 diabetes mellitus    metFORMIN ER (GLUCOPHAGE XR) 500 mg tablet Take 2 Tablets by mouth two (2) times a day. flash glucose sensor (FreeStyle Romeo 2 Sensor) kit 1 Each by Does Not Apply route See Natalia Tarango. Apply and replace sensor every 14 days. Scan sensor at least 4 times daily. E11.65    Semglee,insulin glargine-yfgn, 100 unit/mL soln 40 Units by SubCUTAneous route daily. valsartan-hydroCHLOROthiazide (DIOVAN-HCT) 160-25 mg per tablet Take 1 Tablet by mouth daily. tadalafiL (CIALIS) 10 mg tablet TAKE ONE TABLET BY MOUTH DAILY AS NEEDED    Blood-Glucose Meter monitoring kit Test before meals and at bedtime. DM 2, insulin dependent. Length of need: 99.    lancets (FREESTYLE LANCETS) 28 gauge misc Test before meals and at bedtime     No current facility-administered medications for this visit.        Lab Results   Component Value Date/Time    Sodium 138 12/21/2022 10:27 AM    Potassium 3.9 12/21/2022 10:27 AM    Chloride 106 12/21/2022 10:27 AM    CO2 26 12/21/2022 10:27 AM    Anion gap 6 12/21/2022 10:27 AM    Glucose 235 (H) 12/21/2022 10:27 AM    BUN 14 12/21/2022 10:27 AM    Creatinine 1.10 12/21/2022 10:27 AM    BUN/Creatinine ratio 13 12/21/2022 10:27 AM    GFR est AA >60 07/12/2022 12:58 PM    GFR est non-AA 59 (L) 07/12/2022 12:58 PM    Calcium 9.2 12/21/2022 10:27 AM    Bilirubin, total 0.5 12/21/2022 10:27 AM    Alk. phosphatase 134 (H) 12/21/2022 10:27 AM    Protein, total 7.3 12/21/2022 10:27 AM    Albumin 4.3 12/21/2022 10:27 AM    Globulin 3.0 12/21/2022 10:27 AM    A-G Ratio 1.4 12/21/2022 10:27 AM    ALT (SGPT) 44 12/21/2022 10:27 AM       Lab Results   Component Value Date/Time    Cholesterol, total 173 12/21/2022 10:27 AM    Cholesterol (POC) 199 07/01/2014 04:21 PM    HDL Cholesterol 55 12/21/2022 10:27 AM    HDL Cholesterol (POC) 45 07/01/2014 04:21 PM    LDL Cholesterol (POC) 121 07/01/2014 04:21 PM    LDL, calculated 96.8 12/21/2022 10:27 AM    VLDL, calculated 21.2 12/21/2022 10:27 AM    Triglyceride 106 12/21/2022 10:27 AM    Triglycerides (POC) 171 07/01/2014 04:21 PM    CHOL/HDL Ratio 3.1 12/21/2022 10:27 AM       Lab Results   Component Value Date/Time    WBC 5.3 12/21/2022 10:27 AM    HGB 14.5 12/21/2022 10:27 AM    HCT 42.8 12/21/2022 10:27 AM    PLATELET 555 85/32/4279 10:27 AM    MCV 87.9 12/21/2022 10:27 AM       Lab Results   Component Value Date/Time    Microalbumin/Creat ratio (mg/g creat) 69 (H) 04/19/2022 09:59 AM    Microalbumin,urine random 22.70 04/19/2022 09:59 AM       HbA1c:  Lab Results   Component Value Date/Time    Hemoglobin A1c 9.6 (H) 12/21/2022 10:27 AM    Hemoglobin A1c (POC) 8.8 (A) 04/02/2018 09:35 AM     No components found for: 2     Last Point of Care HGB A1C  Hemoglobin A1c (POC)   Date Value Ref Range Status   04/02/2018 8.8 (A) 4.8 - 5.6 % Final        Estimated Creatinine Clearance: 108.6 mL/min (by C-G formula based on SCr of 1.1 mg/dL).       Medication reconciliation was completed during the visit.    There are no discontinued medications. Patient verbalized understanding of the information presented and all of the patients questions were answered. AVS was handed to the patient. Patient advised to call the office with any additional questions or concerns. Notifications of recommendations will be sent to Dr. Jin Ambrose MD for review. Patient will return to clinic in 4 week(s) for follow up.      Thank you for the consult,  Arlette Mathew, PharmD Candidate [id number]

## 2023-03-12 RX ORDER — TADALAFIL 10 MG/1
TABLET ORAL
Qty: 8 TABLET | Refills: 6 | Status: SHIPPED | OUTPATIENT
Start: 2023-03-12

## 2023-03-18 NOTE — PROGRESS NOTES
Pharmacy Progress Note - Diabetes Management    Assessment / Plan:   Diabetes Management:  - Per ADA guidelines, Pt's A1c is not at goal of < 7%. BP remains above goal <130/80  - Available BGM remains above goal --- trending closer to goal   - Recommend for patient to monitor more PM BG readings  - Start Farxiga 5 mg daily. Review medication's MOA, SE profile, and dosing schedule. Stay hydrated. Also provided patient with mfr savings card to decrease copay burden  - Continue metformin 1 gm BID  - Continue Semglee vial -- 48 units daily for now  - Remind patient about upcoming PCP appt next month      S/O: Mr. Meli Trevino is a 49 y.o. male, referred by Dr. Wes Delgado MD, with a PMH of T2DM, HLD, HTN, was seen today for diabetes management follow up. Patient's last A1c was 9.6% (Dec 2022), 11% (July 2022), 8.3% (April 2022), 8.8% (April 2018), 14.6% (Jan 2016) . Interim update:   Amlodipine 5 mg daily started in February for HTN. Denies any issue w/ new addition. Current anti-hyperglycemic regimen include(s):    - Metformin 500 mg ER - takes 1 gm BID  - Semglee vial - 48 units daily     - No missed doses   - ASA 81 mg daily,   - Valsartan/HCTZ 160/25 mg daily  - LDL 96 ;  (Dec 2022)    ROS:  Today, Pt endorses:  - Symptoms of Hyperglycemia: none  - Symptoms of Hypoglycemia: none    Blood Glucose Monitoring (BGM) or CGM:  Brought in glucometer  Reports some readings are post breakfast - usually 1-2 hrs after meal  All readings are in the AM. Does not check daily  In order of oldest to most recent:  221, 217, 204, 93, 236, 249, 195, 123, 207      Nutrition/Lifestyle Modifications: Wt stable   Reports effort to reduce juice intake  Had two cheeseburger sliders for breakfast today     The 10-year ASCVD risk score (Adwoa JULIO, et al., 2019) is: 16.6%       Vitals:   Wt Readings from Last 3 Encounters:   03/20/23 252 lb (114.3 kg)   02/22/23 251 lb (113.9 kg)   01/17/23 250 lb (113.4 kg)     BP Readings from Last 3 Encounters:   03/20/23 (!) 143/88   02/22/23 (!) 152/100   01/17/23 (!) 155/90     Pulse Readings from Last 3 Encounters:   03/20/23 84   02/22/23 75   01/17/23 80       Past Medical History:   Diagnosis Date    Diabetes ([facility]      No Known Allergies    Current Outpatient Medications   Medication Sig    glucose blood VI test strips (FreeStyle Lite Strips) strip Test ac & hs    amLODIPine (NORVASC) 5 mg tablet TAKE 1 TABLET BY MOUTH EVERY DAY    tadalafiL (CIALIS) 10 mg tablet TAKE ONE TABLET BY MOUTH DAILY AS NEEDED    BD Insulin Syringe Ultra-Fine 1 mL 30 gauge x 1/2\" syrg USE AS DIRECTED    aspirin delayed-release 81 mg tablet Take 81 mg by mouth daily. insulin glargine-yfgn (Semglee,insulin glarg-yfgn,Pen) 100 unit/mL (3 mL) inpn 40 Units by SubCUTAneous route daily. Indications: type 2 diabetes mellitus    metFORMIN ER (GLUCOPHAGE XR) 500 mg tablet Take 2 Tablets by mouth two (2) times a day. flash glucose sensor (FreeStyle Romeo 2 Sensor) kit 1 Each by Does Not Apply route See Natalia Tarango. Apply and replace sensor every 14 days. Scan sensor at least 4 times daily. E11.65    Semglee,insulin glargine-yfgn, 100 unit/mL soln 40 Units by SubCUTAneous route daily. valsartan-hydroCHLOROthiazide (DIOVAN-HCT) 160-25 mg per tablet Take 1 Tablet by mouth daily. Blood-Glucose Meter monitoring kit Test before meals and at bedtime. DM 2, insulin dependent. Length of need: 99.    lancets (FREESTYLE LANCETS) 28 gauge misc Test before meals and at bedtime     No current facility-administered medications for this visit.        Lab Results   Component Value Date/Time    Sodium 138 12/21/2022 10:27 AM    Potassium 3.9 12/21/2022 10:27 AM    Chloride 106 12/21/2022 10:27 AM    CO2 26 12/21/2022 10:27 AM    Anion gap 6 12/21/2022 10:27 AM    Glucose 235 (H) 12/21/2022 10:27 AM    BUN 14 12/21/2022 10:27 AM    Creatinine 1.10 12/21/2022 10:27 AM    BUN/Creatinine ratio 13 12/21/2022 10:27 AM GFR est AA >60 07/12/2022 12:58 PM    GFR est non-AA 59 (L) 07/12/2022 12:58 PM    Calcium 9.2 12/21/2022 10:27 AM    Bilirubin, total 0.5 12/21/2022 10:27 AM    Alk. phosphatase 134 (H) 12/21/2022 10:27 AM    Protein, total 7.3 12/21/2022 10:27 AM    Albumin 4.3 12/21/2022 10:27 AM    Globulin 3.0 12/21/2022 10:27 AM    A-G Ratio 1.4 12/21/2022 10:27 AM    ALT (SGPT) 44 12/21/2022 10:27 AM       Lab Results   Component Value Date/Time    Cholesterol, total 173 12/21/2022 10:27 AM    Cholesterol (POC) 199 07/01/2014 04:21 PM    HDL Cholesterol 55 12/21/2022 10:27 AM    HDL Cholesterol (POC) 45 07/01/2014 04:21 PM    LDL Cholesterol (POC) 121 07/01/2014 04:21 PM    LDL, calculated 96.8 12/21/2022 10:27 AM    VLDL, calculated 21.2 12/21/2022 10:27 AM    Triglyceride 106 12/21/2022 10:27 AM    Triglycerides (POC) 171 07/01/2014 04:21 PM    CHOL/HDL Ratio 3.1 12/21/2022 10:27 AM       Lab Results   Component Value Date/Time    WBC 5.3 12/21/2022 10:27 AM    HGB 14.5 12/21/2022 10:27 AM    HCT 42.8 12/21/2022 10:27 AM    PLATELET 335 26/90/4120 10:27 AM    MCV 87.9 12/21/2022 10:27 AM       Lab Results   Component Value Date/Time    Microalbumin/Creat ratio (mg/g creat) 69 (H) 04/19/2022 09:59 AM    Microalbumin,urine random 22.70 04/19/2022 09:59 AM       HbA1c:  Lab Results   Component Value Date/Time    Hemoglobin A1c 9.6 (H) 12/21/2022 10:27 AM    Hemoglobin A1c (POC) 8.8 (A) 04/02/2018 09:35 AM     No components found for: 2     Last Point of Care HGB A1C  Hemoglobin A1c (POC)   Date Value Ref Range Status   04/02/2018 8.8 (A) 4.8 - 5.6 % Final        Estimated Creatinine Clearance: 108.8 mL/min (by C-G formula based on SCr of 1.1 mg/dL). Medication reconciliation was completed during the visit.     Medications Discontinued During This Encounter   Medication Reason    Semglee,insulin glargine-yfgn, 100 unit/mL soln DOSE ADJUSTMENT     Orders Placed This Encounter    Semglee,insulin glargine-yfgn, 100 unit/mL soln     Si Units by SubCUTAneous route daily. Indications: type 2 diabetes mellitus     Dispense:  12 Each     Refill:  3    dapagliflozin (Farxiga) 5 mg tab tablet     Sig: Take 1 Tablet by mouth daily. Indications: type 2 diabetes mellitus     Dispense:  30 Tablet     Refill:  1     Patient verbalized understanding of the information presented and all of the patients questions were answered. AVS was handed to the patient. Patient advised to call the office with any additional questions or concerns. Notifications of recommendations will be sent to Dr. Grecia Asencio MD for review. Patient will return to clinic in 8 week(s) for follow up.      Thank you for the consult,  Yeimi Hall, PharmD, BCACP, [address] [place] [address]    Program: Medical Group  CPA in place: Yes  Recommendation Provided To: Patient/Caregiver: 4 via In person  Intervention Detail: Discontinued Rx: 1, reason: Duplicate Therapy, New Rx: 1, reason: Needs Additional Therapy, Patient Access Assistance/Sample Provided, and Scheduled Appointment  Intervention Accepted By: Patient/Caregiver: 4  Time Spent (min):  40

## 2023-03-20 ENCOUNTER — OFFICE VISIT (OUTPATIENT)
Dept: INTERNAL MEDICINE CLINIC | Age: 48
End: 2023-03-20

## 2023-03-20 VITALS
HEIGHT: 73 IN | WEIGHT: 252 LBS | DIASTOLIC BLOOD PRESSURE: 88 MMHG | HEART RATE: 84 BPM | SYSTOLIC BLOOD PRESSURE: 143 MMHG | OXYGEN SATURATION: 100 % | BODY MASS INDEX: 33.4 KG/M2

## 2023-03-20 DIAGNOSIS — R80.9 TYPE 2 DIABETES MELLITUS WITH MICROALBUMINURIA, WITH LONG-TERM CURRENT USE OF INSULIN (HCC): Primary | ICD-10-CM

## 2023-03-20 DIAGNOSIS — Z79.4 TYPE 2 DIABETES MELLITUS WITH MICROALBUMINURIA, WITH LONG-TERM CURRENT USE OF INSULIN (HCC): Primary | ICD-10-CM

## 2023-03-20 DIAGNOSIS — E11.29 TYPE 2 DIABETES MELLITUS WITH MICROALBUMINURIA, WITH LONG-TERM CURRENT USE OF INSULIN (HCC): Primary | ICD-10-CM

## 2023-03-20 RX ORDER — INSULIN GLARGINE-YFGN 100 [IU]/ML
50 INJECTION, SOLUTION SUBCUTANEOUS DAILY
Qty: 12 EACH | Refills: 3
Start: 2023-03-20

## 2023-03-20 NOTE — PATIENT INSTRUCTIONS
- Start Farxiga 5 mg daily. This is your new blood sugar medication. Stay hydrated. - Continue Semglee 48 units daily  - Continue metformin 1000 mg twice daily  - Check more afternoon blood sugar readings.  Bring meter to the next visit

## 2023-04-21 ENCOUNTER — OFFICE VISIT (OUTPATIENT)
Dept: INTERNAL MEDICINE CLINIC | Age: 48
End: 2023-04-21

## 2023-04-21 VITALS
SYSTOLIC BLOOD PRESSURE: 120 MMHG | RESPIRATION RATE: 16 BRPM | TEMPERATURE: 97.4 F | DIASTOLIC BLOOD PRESSURE: 82 MMHG | HEART RATE: 88 BPM | WEIGHT: 247.8 LBS | BODY MASS INDEX: 32.84 KG/M2 | OXYGEN SATURATION: 97 % | HEIGHT: 73 IN

## 2023-04-21 DIAGNOSIS — I10 HYPERTENSION, UNSPECIFIED TYPE: ICD-10-CM

## 2023-04-21 DIAGNOSIS — R80.9 MICROALBUMINURIA: ICD-10-CM

## 2023-04-21 DIAGNOSIS — Z79.4 TYPE 2 DIABETES MELLITUS WITH MICROALBUMINURIA, WITH LONG-TERM CURRENT USE OF INSULIN (HCC): ICD-10-CM

## 2023-04-21 DIAGNOSIS — E11.29 TYPE 2 DIABETES MELLITUS WITH MICROALBUMINURIA, WITH LONG-TERM CURRENT USE OF INSULIN (HCC): ICD-10-CM

## 2023-04-21 DIAGNOSIS — E11.319 DIABETIC RETINOPATHY OF LEFT EYE ASSOCIATED WITH TYPE 2 DIABETES MELLITUS, MACULAR EDEMA PRESENCE UNSPECIFIED, UNSPECIFIED RETINOPATHY SEVERITY (HCC): ICD-10-CM

## 2023-04-21 DIAGNOSIS — R80.9 TYPE 2 DIABETES MELLITUS WITH MICROALBUMINURIA, WITH LONG-TERM CURRENT USE OF INSULIN (HCC): ICD-10-CM

## 2023-04-21 DIAGNOSIS — Z23 ENCOUNTER FOR IMMUNIZATION: Primary | ICD-10-CM

## 2023-04-21 NOTE — PROGRESS NOTES
1. Have you been to the ER, urgent care clinic since your last visit? Hospitalized since your last visit? No    2. Have you seen or consulted any other health care providers outside of the [address] [place] [address] since your last visit? Include any pap smears or colon screening.          Opthalmology

## 2023-04-21 NOTE — PROGRESS NOTES
HISTORY OF PRESENT ILLNESS  Azeb Bray is a 49 y.o. male. HPI  Here  for f/u DM-2 with microalbuminuria and retinopathy on insulin obesity and dyslipidemia   Last a1c 9.6 LDL 96  On semglee 48 units every day, Metformin 500mg bid    and farxiga 5mg every day was recently added by pharm D last month. Did not start farxiga yet  Fsbs 100-150  Amlodipine 5 mg every day was also started om February    Eye injection last month per pt from eye MD-has fu appt    Last ov  Last a1c 11.0--semglee increased by 5 units every day to 37 units every day, low calorie diet recommended and appt with pharm D-pt did not see pharm d  He takes semglee 40 unitw every day but sometimes runs out  Fsbs 120-130 but onky cehcks a few days per week  Had been running ot insulin only getting 4 vials every 3 months but needs 4 gavin per month  Some fluid in keft eye-will get injections at [facility]  Taking diovan/hct every other day-he thinks it may be causing hair thinning  No cp sob neuoropathy  Nonsmoker   Etoh 3-4 drinks per weekends  Last Cr 1.31  Due for colon screen and several vaccinations    Patient Active Problem List    Diagnosis Date Noted    Dyslipidemia 01/18/2016    Diabetes mellitus ([facility] [facility] 09/26/2011     Current Outpatient Medications   Medication Sig Dispense Refill    glucose blood VI test strips (FreeStyle Lite Strips) strip Test ac & hs 100 Strip 3    Semglee,insulin glargine-yfgn, 100 unit/mL soln 50 Units by SubCUTAneous route daily. Indications: type 2 diabetes mellitus 12 Each 3    dapagliflozin (Farxiga) 5 mg tab tablet Take 1 Tablet by mouth daily. Indications: type 2 diabetes mellitus 30 Tablet 1    amLODIPine (NORVASC) 5 mg tablet TAKE 1 TABLET BY MOUTH EVERY DAY 30 Tablet 11    tadalafiL (CIALIS) 10 mg tablet TAKE ONE TABLET BY MOUTH DAILY AS NEEDED 8 Tablet 6    BD Insulin Syringe Ultra-Fine 1 mL 30 gauge x 1/2\" syrg USE AS DIRECTED 30 Each 5    aspirin delayed-release 81 mg tablet Take 81 mg by mouth daily. insulin glargine-yfgn (Semglee,insulin glarg-yfgn,Pen) 100 unit/mL (3 mL) inpn 40 Units by SubCUTAneous route daily. Indications: type 2 diabetes mellitus 15 mL 2    metFORMIN ER (GLUCOPHAGE XR) 500 mg tablet Take 2 Tablets by mouth two (2) times a day. 360 Tablet 3    flash glucose sensor (FreeStyle Romeo 2 Sensor) kit 1 Each by Does Not Apply route See Natalia Tarango. Apply and replace sensor every 14 days. Scan sensor at least 4 times daily. E11.65 2 Kit 11    valsartan-hydroCHLOROthiazide (DIOVAN-HCT) 160-25 mg per tablet Take 1 Tablet by mouth daily. 90 Tablet 3    Blood-Glucose Meter monitoring kit Test before meals and at bedtime. DM 2, insulin dependent.   Length of need: 99. 1 Kit 0    lancets (FREESTYLE LANCETS) 28 gauge misc Test before meals and at bedtime 100 Lancet 11     No Known Allergies   Lab Results   Component Value Date/Time    WBC 5.3 12/21/2022 10:27 AM    HGB 14.5 12/21/2022 10:27 AM    HCT 42.8 12/21/2022 10:27 AM    PLATELET 227 19/18/5167 10:27 AM    MCV 87.9 12/21/2022 10:27 AM     Lab Results   Component Value Date/Time    Hemoglobin A1c 9.6 (H) 12/21/2022 10:27 AM    Hemoglobin A1c 11.0 (H) 07/12/2022 12:58 PM    Hemoglobin A1c 8.3 (H) 04/19/2022 09:59 AM    Glucose 235 (H) 12/21/2022 10:27 AM    Glucose  11/23/2011 10:54 AM    Microalbumin/Creat ratio (mg/g creat) 69 (H) 04/19/2022 09:59 AM    Microalbumin,urine random 22.70 04/19/2022 09:59 AM    LDL, calculated 96.8 12/21/2022 10:27 AM    Creatinine 1.10 12/21/2022 10:27 AM      Lab Results   Component Value Date/Time    Cholesterol, total 173 12/21/2022 10:27 AM    Cholesterol (POC) 199 07/01/2014 04:21 PM    HDL Cholesterol 55 12/21/2022 10:27 AM    LDL, calculated 96.8 12/21/2022 10:27 AM    LDL Cholesterol (POC) 121 07/01/2014 04:21 PM    Triglyceride 106 12/21/2022 10:27 AM    Triglycerides (POC) 171 07/01/2014 04:21 PM    CHOL/HDL Ratio 3.1 12/21/2022 10:27 AM     Lab Results   Component Value Date/Time    GFR est non-AA 59 (L) 07/12/2022 12:58 PM    GFR est AA >60 07/12/2022 12:58 PM    Creatinine 1.10 12/21/2022 10:27 AM    BUN 14 12/21/2022 10:27 AM    Sodium 138 12/21/2022 10:27 AM    Potassium 3.9 12/21/2022 10:27 AM    Chloride 106 12/21/2022 10:27 AM    CO2 26 12/21/2022 10:27 AM     Lab Results   Component Value Date/Time    Prostate Specific Ag 0.6 12/21/2022 10:27 AM     Lab Results   Component Value Date/Time    TSH 0.92 12/21/2022 10:27 AM      Lab Results   Component Value Date/Time    Glucose 235 (H) 12/21/2022 10:27 AM    Glucose  11/23/2011 10:54 AM         ROS    Physical Exam  Vitals and nursing note reviewed. Constitutional:       General: He is not in acute distress. Appearance: Normal appearance. He is well-developed. He is obese. HENT:      Head: Normocephalic and atraumatic. Right Ear: Tympanic membrane normal.      Left Ear: Tympanic membrane normal.      Nose: Nose normal.      Mouth/Throat:      Mouth: Mucous membranes are moist.   Eyes:      Pupils: Pupils are equal, round, and reactive to light. Neck:      Vascular: No carotid bruit. Cardiovascular:      Rate and Rhythm: Normal rate and regular rhythm. Pulses: Normal pulses. Heart sounds: Normal heart sounds. No murmur heard. No friction rub. No gallop. Pulmonary:      Effort: Pulmonary effort is normal.      Breath sounds: Normal breath sounds. Abdominal:      General: Bowel sounds are normal.      Palpations: Abdomen is soft. Musculoskeletal:      Cervical back: Neck supple. Right lower leg: No edema. Left lower leg: No edema. Lymphadenopathy:      Cervical: No cervical adenopathy. Skin:     General: Skin is warm. Neurological:      General: No focal deficit present. Mental Status: He is alert. Psychiatric:         Mood and Affect: Mood normal.         Thought Content: Thought content normal.         Judgment: Judgment normal.       ASSESSMENT and PLAN    ICD-10-CM ICD-9-CM    1. Encounter for immunization  Z23 V03.89 TDAP, BOOSTRIX, (AGE 10 YRS+), IM      PNEUMOCOCCAL, PCV20, PREVNAR 20, (AGE 18 YRS+), IM, PF      CANCELED: PNEUMOCOCCAL, PPSV23, (AGE 2 YRS+), SC/IM      2. Type 2 diabetes mellitus with microalbuminuria, with long-term current use of insulin (HCC)  E11.29 250.40 HEMOGLOBIN A1C WITH EAG    C80.8 269.6 METABOLIC PANEL, COMPREHENSIVE    Z79.4 V58.67 MICROALBUMIN, UR, RAND W/ MICROALB/CREAT RATIO      MICROALBUMIN, UR, RAND W/ MICROALB/CREAT RATIO      HEMOGLOBIN A1C WITH EAG      METABOLIC PANEL, COMPREHENSIVE  Will start faxiga  Roe pharm D  Roe eye MD   Work on diet--d/w pt       3. Diabetic retinopathy of left eye associated with type 2 diabetes mellitus, macular edema presence unspecified, unspecified retinopathy severity ([address]  A88.242 718.77 METABOLIC PANEL, COMPREHENSIVE     683.93 METABOLIC PANEL, COMPREHENSIVE      4. Hypertension, unspecified type  D08 451.7 METABOLIC PANEL, COMPREHENSIVE      METABOLIC PANEL, COMPREHENSIVE  Good control now      5.  Microalbuminuria  R80.9 791.0    Rtc 6 months above

## 2023-04-23 LAB
ALBUMIN SERPL-MCNC: 4.2 G/DL (ref 3.5–5)
ALBUMIN/GLOB SERPL: 1.2 (ref 1.1–2.2)
ALP SERPL-CCNC: 63 U/L (ref 45–117)
ALT SERPL-CCNC: 51 U/L (ref 12–78)
ANION GAP SERPL CALC-SCNC: 7 MMOL/L (ref 5–15)
AST SERPL-CCNC: 33 U/L (ref 15–37)
BILIRUB SERPL-MCNC: 0.9 MG/DL (ref 0.2–1)
BUN SERPL-MCNC: 12 MG/DL (ref 6–20)
BUN/CREAT SERPL: 11 (ref 12–20)
CALCIUM SERPL-MCNC: 9.3 MG/DL (ref 8.5–10.1)
CHLORIDE SERPL-SCNC: 107 MMOL/L (ref 97–108)
CO2 SERPL-SCNC: 26 MMOL/L (ref 21–32)
CREAT SERPL-MCNC: 1.13 MG/DL (ref 0.7–1.3)
CREAT UR-MCNC: 166 MG/DL
EST. AVERAGE GLUCOSE BLD GHB EST-MCNC: 189 MG/DL
GLOBULIN SER CALC-MCNC: 3.4 G/DL (ref 2–4)
GLUCOSE SERPL-MCNC: 72 MG/DL (ref 65–100)
HBA1C MFR BLD: 8.2 % (ref 4–5.6)
MICROALBUMIN UR-MCNC: 2.34 MG/DL
MICROALBUMIN/CREAT UR-RTO: 14 MG/G (ref 0–30)
POTASSIUM SERPL-SCNC: 3.7 MMOL/L (ref 3.5–5.1)
PROT SERPL-MCNC: 7.6 G/DL (ref 6.4–8.2)
SODIUM SERPL-SCNC: 140 MMOL/L (ref 136–145)

## 2023-05-22 ENCOUNTER — PHARMACY VISIT (OUTPATIENT)
Age: 48
End: 2023-05-22

## 2023-05-22 VITALS
BODY MASS INDEX: 32.6 KG/M2 | HEART RATE: 77 BPM | WEIGHT: 246 LBS | OXYGEN SATURATION: 100 % | DIASTOLIC BLOOD PRESSURE: 75 MMHG | HEIGHT: 73 IN | SYSTOLIC BLOOD PRESSURE: 121 MMHG

## 2023-05-22 DIAGNOSIS — E11.29 TYPE 2 DIABETES MELLITUS WITH OTHER DIABETIC KIDNEY COMPLICATION (HCC): Primary | ICD-10-CM

## 2023-05-22 RX ORDER — DAPAGLIFLOZIN 5 MG/1
5 TABLET, FILM COATED ORAL DAILY
COMMUNITY
Start: 2023-04-20

## 2023-05-22 NOTE — PATIENT INSTRUCTIONS
- Continue Farxiga 5 mg daily   - Continue metformin 1000 mg twice daily  - Continue Semglee insulin 48 units daily  - Check blood sugar more consistently   - Check your blood sugar when you experience symptoms similar to a low blood sugar event     Your blood sugar goals:  - Fasting (first thing in the morning)  blood sugar: 80 - 130   - 1 to 2 hours after a meal: less than 180     When you experience symptoms of low blood sugar (example: less than 70):  - Confirm low reading by checking your blood sugar.   - Then treat with 15 grams of carbohydrates (one-half cup of juice or regular soda, or 4-5 glucose tablets).   - Wait 15 minutes to recheck blood sugar.   - Then eat a protein containing meal/snack to prevent another low blood sugar episode. (example: peanut butter + crackers)

## 2023-06-19 RX ORDER — DAPAGLIFLOZIN 5 MG/1
TABLET, FILM COATED ORAL
Qty: 30 TABLET | Refills: 1 | Status: SHIPPED | OUTPATIENT
Start: 2023-06-19

## 2023-06-21 RX ORDER — INSULIN GLARGINE-YFGN 100 [IU]/ML
INJECTION, SOLUTION SUBCUTANEOUS
Qty: 50 ML | Refills: 5 | Status: SHIPPED | OUTPATIENT
Start: 2023-06-21 | End: 2023-07-21 | Stop reason: SDUPTHER

## 2023-07-21 RX ORDER — INSULIN GLARGINE-YFGN 100 [IU]/ML
INJECTION, SOLUTION SUBCUTANEOUS
Qty: 100 ML | Refills: 3 | Status: SHIPPED | OUTPATIENT
Start: 2023-07-21

## 2023-07-21 NOTE — TELEPHONE ENCOUNTER
Patient called in to request [facility]s [facility]     FARXIGA 5 MG tablet  SEMGLEE, BONGN, 100 UNIT/ML injection vial

## 2023-08-04 NOTE — PROGRESS NOTES
Pharmacy Progress Note - Diabetes Management    Assessment / Plan:   Diabetes Management:  - Per ADA guidelines, Pt's POC A1c today (7.5%) is not at goal of < 7%. Continues to make great progress. - Limited BG available to assess. Discuss this again with patient today. - Continue Farxiga 5 mg daily  - Continue metformin 1 gm BID  - Continue Semglee insulin at 48 units daily  - Will follow up with patient on the same day as upcoming PCP appt      S/O: Mr. Meredith Woody is a 49 y.o. male, referred by Dr. Taylor Golden MD, with a PMH of T2DM, HLD, HTN, was seen today for diabetes management follow up. Patient's last A1c was 8.2% (April 2023), 9.6% (Dec 2022), 11% (July 2022), 8.3% (April 2022), 8.8% (April 2018), 14.6% (Jan 2016) . Last seen by me in May 2022    Interim update:   Ran out of Svitlana for several weeks  Restarted Farxiga 5 mg in July  --- $45 for 90 days    Current anti-hyperglycemic regimen include(s):    - Metformin 1 gm BID  - Farxiga 5 mg daily  - Semglee vial - 48 units daily AM -- still has 3 vials left    - ASA 81 mg daily,   - Valsartan/HCTZ 160/25 mg daily, Amlodipine 5 mg daily  - LDL 96 ;  (Dec 2022)    ROS:  Today, Pt endorses:  - Symptoms of Hyperglycemia: none  - Symptoms of Hypoglycemia: none    Blood Glucose Monitoring (BGM) or CGM:  Brought in glucometer. Not checking daily. Usually fasting  Recent readings   119,199,137,152,176,126    Nutrition/Lifestyle Modifications:  Generally 2 meals/day   Dinner last night: several slices of pizza  Usually drinks water or unsweetened tea  +/- splenda   Denies other snacking throughout the day    Receiving monthly eye injections at [facility]. Upcoming appt 8/21/23     Vitals:   Wt Readings from Last 3 Encounters:   08/07/23 248 lb (112.5 kg)   05/22/23 246 lb (111.6 kg)   04/21/23 247 lb 12.8 oz (112.4 kg)     BP Readings from Last 3 Encounters:   08/07/23 132/89   05/22/23 121/75   04/21/23 120/82     Pulse Readings from Last 3

## 2023-08-07 ENCOUNTER — PHARMACY VISIT (OUTPATIENT)
Age: 48
End: 2023-08-07
Payer: COMMERCIAL

## 2023-08-07 VITALS
OXYGEN SATURATION: 98 % | HEART RATE: 85 BPM | HEIGHT: 73 IN | BODY MASS INDEX: 32.87 KG/M2 | WEIGHT: 248 LBS | DIASTOLIC BLOOD PRESSURE: 89 MMHG | SYSTOLIC BLOOD PRESSURE: 132 MMHG

## 2023-08-07 DIAGNOSIS — E11.29 TYPE 2 DIABETES MELLITUS WITH OTHER DIABETIC KIDNEY COMPLICATION (HCC): Primary | ICD-10-CM

## 2023-08-07 LAB — HBA1C MFR BLD: 7.5 %

## 2023-08-07 PROCEDURE — 83036 HEMOGLOBIN GLYCOSYLATED A1C: CPT | Performed by: PHARMACIST

## 2023-08-07 NOTE — PATIENT INSTRUCTIONS
Your A1c today was 7.5%.  Your goal is to be below 7%  Continue metformin 1000 mg twice daily  Continue Farxiga 5 mg daily  Continue Semglee insulin at 48 units daily  Check blood sugar more often

## 2023-10-04 RX ORDER — INSULIN GLARGINE-YFGN 100 [IU]/ML
INJECTION, SOLUTION SUBCUTANEOUS
OUTPATIENT
Start: 2023-10-04

## 2023-10-04 RX ORDER — INSULIN GLARGINE-YFGN 100 [IU]/ML
48 INJECTION, SOLUTION SUBCUTANEOUS DAILY
Qty: 100 ML | Refills: 3 | Status: SHIPPED | OUTPATIENT
Start: 2023-10-04

## 2023-10-16 NOTE — PROGRESS NOTES
Encounters:   10/23/23 73   08/07/23 85   05/22/23 77       Past Medical History:   Diagnosis Date    Diabetes (HCC)      No Known Allergies    Current Outpatient Medications   Medication Sig    insulin glargine-yfgn (SEMGLEE, YFGN,) 100 UNIT/ML injection vial Inject 48 Units into the skin daily INJECT 40 UNITS UNDER THE SKIN DAILY    dapagliflozin (FARXIGA) 5 MG tablet TAKE 1 TABLET BY MOUTH DAILY. INDICATIONS: TYPE 2 DIABETES MELLITUS (Patient taking differently: Take 1 tablet by mouth daily)    amLODIPine (NORVASC) 5 MG tablet Take 1 tablet by mouth daily    aspirin 81 MG EC tablet Take 1 tablet by mouth daily    metFORMIN (GLUCOPHAGE-XR) 500 MG extended release tablet Take 2 tablets by mouth in the morning and at bedtime    tadalafil (CIALIS) 10 MG tablet Take 1 tablet by mouth as needed    valsartan-hydroCHLOROthiazide (DIOVAN-HCT) 160-25 MG per tablet Take 1 tablet by mouth daily     No current facility-administered medications for this visit.        Lab Results   Component Value Date/Time     04/21/2023 12:39 PM    K 3.7 04/21/2023 12:39 PM     04/21/2023 12:39 PM    CO2 26 04/21/2023 12:39 PM    BUN 12 04/21/2023 12:39 PM    GFRAA >60 07/12/2022 12:58 PM    GLOB 3.4 04/21/2023 12:39 PM    ALT 51 04/21/2023 12:39 PM       Lab Results   Component Value Date/Time    CHOL 173 12/21/2022 10:27 AM    HDL 55 12/21/2022 10:27 AM       Lab Results   Component Value Date/Time    WBC 5.3 12/21/2022 10:27 AM    HGB 14.5 12/21/2022 10:27 AM    HCT 42.8 12/21/2022 10:27 AM     12/21/2022 10:27 AM    MCV 87.9 12/21/2022 10:27 AM       Lab Results   Component Value Date/Time    MICROALBUR 2.34 04/21/2023 12:39 PM    LABCREA 166.00 04/21/2023 12:39 PM         HbA1c:  Lab Results   Component Value Date    LABA1C 8.2 (H) 04/21/2023    LABA1C 9.6 (H) 12/21/2022    LABA1C 11.0 (H) 07/12/2022         Last Point of Care HGB A1C  Hemoglobin A1C, POC   Date Value Ref Range Status   10/23/2023 7.3 % Final

## 2023-10-20 PROBLEM — E66.9 OBESITY (BMI 30-39.9): Status: ACTIVE | Noted: 2023-10-20

## 2023-10-20 NOTE — PROGRESS NOTES
HISTORY OF PRESENT ILLNESS   Flor Basilio   is a 51 y.o.  male. Here  for f/u DM-2 with microalbuminuria and retinopathy on insulin obesity HTN and  dyslipidemia and CPE  Recent a1c down to 7.5-seeing Pharm D on farxiga 5 mg qd metformin 1 gm bid and semglee 48 units qd  Amb a1c 7.3  Fsbs-around 120--this AM 98    Home BP on norvasc and diovan /hct  Sees eye MD turciosy    Last OV   Last a1c 9.6 LDL 96   On semglee 48 units every day, Metformin 500mg bid    and farxiga 5mg every day was recently added by pharm D last month. Did not start farxiga yet   Fsbs 100-150   Amlodipine 5 mg every day was also started om February      Eye injection last month per pt from eye MD-has fu appt     Patient Active Problem List    Diagnosis Date Noted    Diabetic retinopathy associated with type 2 diabetes mellitus ([address]) 04/21/2023    HTN (hypertension) 04/21/2023    Microalbuminuria 04/21/2023    Diabetes mellitus ([address]) 09/26/2011     Current Outpatient Medications   Medication Sig Dispense Refill    insulin glargine-yfgn (SEMGLEE, YFGN,) 100 UNIT/ML injection vial Inject 48 Units into the skin daily INJECT 40 UNITS UNDER THE SKIN DAILY 100 mL 3    dapagliflozin (FARXIGA) 5 MG tablet TAKE 1 TABLET BY MOUTH DAILY. INDICATIONS: TYPE 2 DIABETES MELLITUS (Patient taking differently: Take 1 tablet by mouth daily) 90 tablet 3    amLODIPine (NORVASC) 5 MG tablet Take 1 tablet by mouth daily      aspirin 81 MG EC tablet Take 1 tablet by mouth daily      metFORMIN (GLUCOPHAGE-XR) 500 MG extended release tablet Take 2 tablets by mouth in the morning and at bedtime      tadalafil (CIALIS) 10 MG tablet Take 1 tablet by mouth as needed      valsartan-hydroCHLOROthiazide (DIOVAN-HCT) 160-25 MG per tablet Take 1 tablet by mouth daily       No current facility-administered medications for this visit.      No Known Allergies  Social History     Tobacco Use    Smoking status: Never    Smokeless tobacco: Never   Substance Use Topics    Alcohol

## 2023-10-23 ENCOUNTER — OFFICE VISIT (OUTPATIENT)
Age: 48
End: 2023-10-23
Payer: COMMERCIAL

## 2023-10-23 ENCOUNTER — PHARMACY VISIT (OUTPATIENT)
Age: 48
End: 2023-10-23

## 2023-10-23 ENCOUNTER — TELEPHONE (OUTPATIENT)
Age: 48
End: 2023-10-23

## 2023-10-23 VITALS
HEIGHT: 73 IN | TEMPERATURE: 97.3 F | OXYGEN SATURATION: 99 % | WEIGHT: 248.4 LBS | DIASTOLIC BLOOD PRESSURE: 86 MMHG | SYSTOLIC BLOOD PRESSURE: 126 MMHG | BODY MASS INDEX: 32.92 KG/M2 | RESPIRATION RATE: 18 BRPM | HEART RATE: 73 BPM

## 2023-10-23 DIAGNOSIS — E11.29 TYPE 2 DIABETES MELLITUS WITH OTHER DIABETIC KIDNEY COMPLICATION (HCC): Primary | ICD-10-CM

## 2023-10-23 DIAGNOSIS — E66.9 OBESITY (BMI 30-39.9): ICD-10-CM

## 2023-10-23 DIAGNOSIS — Z00.00 ROUTINE GENERAL MEDICAL EXAMINATION AT A HEALTH CARE FACILITY: Primary | ICD-10-CM

## 2023-10-23 DIAGNOSIS — Z00.00 ROUTINE PHYSICAL EXAMINATION: Primary | ICD-10-CM

## 2023-10-23 DIAGNOSIS — I10 HYPERTENSION, UNSPECIFIED TYPE: ICD-10-CM

## 2023-10-23 DIAGNOSIS — Z00.00 ROUTINE GENERAL MEDICAL EXAMINATION AT A HEALTH CARE FACILITY: ICD-10-CM

## 2023-10-23 DIAGNOSIS — E11.319 TYPE 2 DIABETES MELLITUS WITH RETINOPATHY OF BOTH EYES, WITH LONG-TERM CURRENT USE OF INSULIN, MACULAR EDEMA PRESENCE UNSPECIFIED, UNSPECIFIED RETINOPATHY SEVERITY (HCC): ICD-10-CM

## 2023-10-23 DIAGNOSIS — Z79.4 TYPE 2 DIABETES MELLITUS WITH RETINOPATHY OF BOTH EYES, WITH LONG-TERM CURRENT USE OF INSULIN, MACULAR EDEMA PRESENCE UNSPECIFIED, UNSPECIFIED RETINOPATHY SEVERITY (HCC): ICD-10-CM

## 2023-10-23 DIAGNOSIS — Z12.11 COLON CANCER SCREENING: ICD-10-CM

## 2023-10-23 LAB — HBA1C MFR BLD: 7.3 %

## 2023-10-23 PROCEDURE — 3074F SYST BP LT 130 MM HG: CPT | Performed by: INTERNAL MEDICINE

## 2023-10-23 PROCEDURE — 99396 PREV VISIT EST AGE 40-64: CPT | Performed by: INTERNAL MEDICINE

## 2023-10-23 PROCEDURE — 3079F DIAST BP 80-89 MM HG: CPT | Performed by: INTERNAL MEDICINE

## 2023-10-23 PROCEDURE — 83036 HEMOGLOBIN GLYCOSYLATED A1C: CPT | Performed by: INTERNAL MEDICINE

## 2023-10-23 RX ORDER — PEN NEEDLE, DIABETIC 29 G X1/2"
NEEDLE, DISPOSABLE MISCELLANEOUS
COMMUNITY
Start: 2023-09-03

## 2023-10-23 SDOH — ECONOMIC STABILITY: FOOD INSECURITY: WITHIN THE PAST 12 MONTHS, YOU WORRIED THAT YOUR FOOD WOULD RUN OUT BEFORE YOU GOT MONEY TO BUY MORE.: NEVER TRUE

## 2023-10-23 SDOH — ECONOMIC STABILITY: HOUSING INSECURITY
IN THE LAST 12 MONTHS, WAS THERE A TIME WHEN YOU DID NOT HAVE A STEADY PLACE TO SLEEP OR SLEPT IN A SHELTER (INCLUDING NOW)?: NO

## 2023-10-23 SDOH — ECONOMIC STABILITY: FOOD INSECURITY: WITHIN THE PAST 12 MONTHS, THE FOOD YOU BOUGHT JUST DIDN'T LAST AND YOU DIDN'T HAVE MONEY TO GET MORE.: NEVER TRUE

## 2023-10-23 SDOH — ECONOMIC STABILITY: INCOME INSECURITY: HOW HARD IS IT FOR YOU TO PAY FOR THE VERY BASICS LIKE FOOD, HOUSING, MEDICAL CARE, AND HEATING?: NOT HARD AT ALL

## 2023-10-23 ASSESSMENT — PATIENT HEALTH QUESTIONNAIRE - PHQ9
1. LITTLE INTEREST OR PLEASURE IN DOING THINGS: 0
SUM OF ALL RESPONSES TO PHQ9 QUESTIONS 1 & 2: 0
SUM OF ALL RESPONSES TO PHQ QUESTIONS 1-9: 0
SUM OF ALL RESPONSES TO PHQ QUESTIONS 1-9: 0
2. FEELING DOWN, DEPRESSED OR HOPELESS: 0
SUM OF ALL RESPONSES TO PHQ QUESTIONS 1-9: 0
SUM OF ALL RESPONSES TO PHQ QUESTIONS 1-9: 0

## 2023-10-23 NOTE — PATIENT INSTRUCTIONS
Your A1c today was 7.3%. Your goal is to be below 7%. Continue Semglee insulin - 48 units daily  Continue metformin 1000 mg twice daily  Continue Farxiga 5 mg daily.  Stay hydrated

## 2023-10-23 NOTE — PROGRESS NOTES
1. \"Have you been to the ER, urgent care clinic since your last visit? Hospitalized since your last visit? \" No    2. \"Have you seen or consulted any other health care providers outside of the [address] [place] [address] since your last visit? \" No     3. For patients aged 43-73: Has the patient had a colonoscopy / FIT/ Cologuard?  No

## 2023-10-24 LAB
ALBUMIN SERPL-MCNC: 4.1 G/DL (ref 3.5–5)
ALBUMIN/GLOB SERPL: 1.3 (ref 1.1–2.2)
ALP SERPL-CCNC: 66 U/L (ref 45–117)
ALT SERPL-CCNC: 41 U/L (ref 12–78)
ANION GAP SERPL CALC-SCNC: 7 MMOL/L (ref 5–15)
AST SERPL-CCNC: 21 U/L (ref 15–37)
BILIRUB SERPL-MCNC: 0.9 MG/DL (ref 0.2–1)
BUN SERPL-MCNC: 16 MG/DL (ref 6–20)
BUN/CREAT SERPL: 14 (ref 12–20)
CALCIUM SERPL-MCNC: 9.2 MG/DL (ref 8.5–10.1)
CHLORIDE SERPL-SCNC: 106 MMOL/L (ref 97–108)
CHOLEST SERPL-MCNC: 158 MG/DL
CO2 SERPL-SCNC: 25 MMOL/L (ref 21–32)
CREAT SERPL-MCNC: 1.13 MG/DL (ref 0.7–1.3)
ERYTHROCYTE [DISTWIDTH] IN BLOOD BY AUTOMATED COUNT: 12.2 % (ref 11.5–14.5)
GLOBULIN SER CALC-MCNC: 3.1 G/DL (ref 2–4)
GLUCOSE SERPL-MCNC: 81 MG/DL (ref 65–100)
HCT VFR BLD AUTO: 45.4 % (ref 36.6–50.3)
HDLC SERPL-MCNC: 58 MG/DL
HDLC SERPL: 2.7 (ref 0–5)
HGB BLD-MCNC: 14.9 G/DL (ref 12.1–17)
LDLC SERPL CALC-MCNC: 87.6 MG/DL (ref 0–100)
MCH RBC QN AUTO: 29.6 PG (ref 26–34)
MCHC RBC AUTO-ENTMCNC: 32.8 G/DL (ref 30–36.5)
MCV RBC AUTO: 90.1 FL (ref 80–99)
NRBC # BLD: 0 K/UL (ref 0–0.01)
NRBC BLD-RTO: 0 PER 100 WBC
PLATELET # BLD AUTO: 223 K/UL (ref 150–400)
PMV BLD AUTO: 11.1 FL (ref 8.9–12.9)
POTASSIUM SERPL-SCNC: 4 MMOL/L (ref 3.5–5.1)
PROT SERPL-MCNC: 7.2 G/DL (ref 6.4–8.2)
RBC # BLD AUTO: 5.04 M/UL (ref 4.1–5.7)
SODIUM SERPL-SCNC: 138 MMOL/L (ref 136–145)
TRIGL SERPL-MCNC: 62 MG/DL
VLDLC SERPL CALC-MCNC: 12.4 MG/DL
WBC # BLD AUTO: 5.5 K/UL (ref 4.1–11.1)

## 2023-10-25 LAB
PSA SERPL-MCNC: 0.7 NG/ML (ref 0–4)
REFLEX CRITERIA: NORMAL

## 2023-11-15 LAB — NONINV COLON CA DNA+OCC BLD SCRN STL QL: NEGATIVE

## 2023-11-28 RX ORDER — VALSARTAN AND HYDROCHLOROTHIAZIDE 160; 25 MG/1; MG/1
1 TABLET ORAL DAILY
Qty: 90 TABLET | Refills: 3 | Status: SHIPPED | OUTPATIENT
Start: 2023-11-28

## 2024-01-04 ENCOUNTER — TELEPHONE (OUTPATIENT)
Age: 49
End: 2024-01-04

## 2024-01-04 DIAGNOSIS — R05.1 ACUTE COUGH: Primary | ICD-10-CM

## 2024-01-04 RX ORDER — AZITHROMYCIN 250 MG/1
250 TABLET, FILM COATED ORAL SEE ADMIN INSTRUCTIONS
Qty: 6 TABLET | Refills: 0 | Status: SHIPPED | OUTPATIENT
Start: 2024-01-04 | End: 2024-01-09

## 2024-01-04 RX ORDER — INSULIN GLARGINE-YFGN 100 [IU]/ML
48 INJECTION, SOLUTION SUBCUTANEOUS DAILY
Qty: 15 ML | Refills: 2 | Status: SHIPPED | OUTPATIENT
Start: 2024-01-04

## 2024-01-04 RX ORDER — PEN NEEDLE, DIABETIC 32GX 5/32"
1 NEEDLE, DISPOSABLE MISCELLANEOUS DAILY
Qty: 100 EACH | Refills: 2 | Status: SHIPPED | OUTPATIENT
Start: 2024-01-04

## 2024-01-04 NOTE — TELEPHONE ENCOUNTER
Pharmacy Progress Note - Telephone Encounter    Mr. Rick Szymanski Jr 48 y.o. was contacted via an outbound telephone call regarding his previous call today.  A voicemail was left for patient to return my call.     - Down to his last insulin vial.  - Will send in rx for Semglee insulin pen. Continue 48 units daily.     Medications Discontinued During This Encounter   Medication Reason    insulin glargine-yfgn (SEMGLEE, YFGN,) 100 UNIT/ML injection vial Alternate therapy    insulin glargine-yfgn (SEMGLEE, YFGN,) 100 UNIT/ML injection vial Alternate therapy    B-D INS SYR ULTRAFINE 1CC/30G 30G X 1/2\" 1 ML MISC Alternate therapy     Orders Placed This Encounter    Insulin Glargine-yfgn (SEMGLEE, YFGN,) 100 UNIT/ML SOPN     Sig: Inject 48 Units into the skin daily     Dispense:  15 mL     Refill:  2     Changing to insulin pen    Insulin Pen Needle (BD PEN NEEDLE JATINDER U/F) 32G X 4 MM MISC     Si each by Does not apply route daily Use to give insulin under the skin daily E11.65     Dispense:  100 each     Refill:  2        Thank you,  Esther Srivastava, PharmD, BCACP, CDCES      For Pharmacy Admin Tracking Only    Program: Medical Group  CPA in place:  Yes  Recommendation Provided To: Patient/Caregiver: 5 via Telephone  Intervention Detail: Discontinued Rx: 3, reason: Patient Preference, Therapy Complete and New Rx: 2, reason: Patient Preference  Intervention Accepted By: Patient/Caregiver: 5  Gap Closed?: Yes   Time Spent (min): 5

## 2024-01-04 NOTE — TELEPHONE ENCOUNTER
Attempted to reach patient. Left message on vm to return call     Per Dr. Bob  Tel pt I sent in a zpak

## 2024-01-04 NOTE — TELEPHONE ENCOUNTER
----- Message from Riana Pham sent at 1/4/2024  1:41 PM EST -----  Subject: Message to Provider    QUESTIONS  Information for Provider? Patient is returning a call to the office   regarding medications and is requesting if the office can reach back out   to him. Please advise.   ---------------------------------------------------------------------------  --------------  CALL BACK INFO  [phone]; OK to leave message on voicemail  ---------------------------------------------------------------------------  --------------  SCRIPT ANSWERS  undefined   Daily Note     Today's date: 3/26/2018  Patient name: Tank Spence  : 1965  MRN: [id number]  Referring provider: Judy Baptiste MD  Dx:   Encounter Diagnosis     ICD-10-CM    1  Pain of right heel M79 671    2  Plantar fascial fibromatosis of right foot M72 2    3  Acute ischemic right MCA stroke ([facility] [address] ) I63 511    4  Arterial ischemic stroke, MCA (middle cerebral artery), right, acute ([facility] [address] ) I63 511    5  Fall, subsequent encounter W19  XXXD                   Subjective: Patient noted no pain in R plantar fascia today         Objective: See treatment diary below  Precautions: R foot heel spur, R MCA Stroke     Daily Treatment Diary   Manual     3/19  3/22  3/26               DTM R plantar fascia   x5min  5 min            x8               DTM R Achilles tendon using Graston tool   x3min  x3min  x5                                                                                             Exercise Diary   3/14  3/19  3/22  3/26               Squats 2x10   on bosu x30  on bosu x30               Lunges  3 laps   Walking in [place], 40'x2 [employer] in [place], 40'x2               Tandem gait  foam, 3 laps   in [place], arms crossed EC 40'x2  in [place], arms crossed EC 40'x2               Ambulation with HT/HN  1 lap                     Standing L hip - 3 way, TB  green, 15x                     Bike  10 min  10 min 10 min  nu step 10 min level 6  Hand bars at 7                tandem stance-foam Ec, 30''x3                      rockerboard taps 15x                     rockerboard balance 30''x2   M/L and A/P 1 min ea  M/L and A/P 1 min ea               step ups-  6''+ foam, fwd/lateral    6" + foam FWD 2x15 R/L  6" + foam FWD 2x15 R/L               SLS R   3x30s  R/L EO/EC on foam 30s ea  R/L EO/EC on foam 30s ea               gastroc/soleus stretch on R   standing off step  [id number] standing off step  [id number]  HEP today               towel scrunching B feet in sitting     x30  x30                                                                                                                                                                                              Assessment: Tolerated treatment well  Patient needed VC throughout treatment to correct form for exercises  Did not perform gastroc stretch in treatment today due to patient stating that she performed stretch before she came in the morning  Progress patient as per patient tolerance  Patient would benefit from continued PT      Plan: Continue per plan of care

## 2024-01-04 NOTE — TELEPHONE ENCOUNTER
Caller States:  Symptoms:   Dry cough at first  Cough productive  Clear and yellow  Tickling in throat  Denies fever, chills sweats  Denies body aches    Onset:   Last Monday   Covid Test:  NEGATIVE,  on day new years day  What has been tried:  Alkaseltzer cold plus  Dayquil       Appointments:  Appointment offers during call:  No available appts this week    Date of last appointment:    10/23/2023       Pharmacy:  [facility]/pharmacy #[id number] - [facility] [address] [facility] [phone] [facility] [phone]        Request:  Request medication        Caller confirms readback of documented phone/fax number(s) as correct.    Caller advised that there can be a 24-48 business hour turn around time on callbacks.    Caller advised that if pt deems they cannot wait any longer or symptoms worsen, ED or UC would be another option to consider for immediate treatment.

## 2024-01-04 NOTE — TELEPHONE ENCOUNTER
States was advised by Maliha to notify her when he is on his last vial of insulin    States on last vial    States was to be switched to insulin pens    Can this be done now?

## 2024-01-11 ENCOUNTER — TELEPHONE (OUTPATIENT)
Age: 49
End: 2024-01-11

## 2024-01-11 RX ORDER — INSULIN GLARGINE 100 [IU]/ML
48 INJECTION, SOLUTION SUBCUTANEOUS DAILY
Qty: 5 ADJUSTABLE DOSE PRE-FILLED PEN SYRINGE | Refills: 3 | Status: SHIPPED | COMMUNITY
Start: 2024-01-11

## 2024-01-11 NOTE — TELEPHONE ENCOUNTER
Pharmacy Progress Note - Telephone Encounter    S/O: Mr. Rick Szymanski Jr 48 y.o. male, referred by Olivier Lewis MD, was contacted via an outbound telephone call to discuss his insulin access today. Verified patient’s identifiers (name & ) per HIPAA policy.     Semglee pen copay is $60. He's currently out of insulin  Pen needle rx at [facility] was $54/box.      A/P:  - Discuss Semglee mfr's savings card to help w/ current copay.   - Will assist patient w/ 1 Lantus sample - 48 units daily   - Discuss Relion pen needles as a cheaper alternative  - Patient endorses understanding to the provided information. All questions answered at this time.     There are no discontinued medications.  Orders Placed This Encounter    insulin glargine (LANTUS SOLOSTAR) 100 UNIT/ML injection pen     Sig: Inject 48 Units into the skin daily     Dispense:  5 Adjustable Dose Pre-filled Pen Syringe     Refill:  3     Order Specific Question:   Expiration Date     Answer:   2024     Order Specific Question:   Lot#     Answer:   [id number]     Order Specific Question:        Answer:   [employer]          Thank you,  Esther Srivastava, PharmD, BCACP, CDCES      For Pharmacy Admin Tracking Only    Program: Medical Group  CPA in place:  Yes  Recommendation Provided To: Patient/Caregiver: 2 via Telephone  Intervention Detail: Benefit Assistance and Patient Access Assistance/Sample Provided  Intervention Accepted By: Patient/Caregiver: 2  Gap Closed?: Yes   Time Spent (min): 15

## 2024-01-16 RX ORDER — INSULIN GLARGINE-YFGN 100 [IU]/ML
48 INJECTION, SOLUTION SUBCUTANEOUS DAILY
Qty: 15 ML | Refills: 2 | Status: SHIPPED | OUTPATIENT
Start: 2024-01-16

## 2024-01-16 NOTE — TELEPHONE ENCOUNTER
PCP: Olivier Bob MD    Last appt: 10/23/2023   Future Appointments   Date Time Provider Department Center   1/22/2024 11:30 AM Esther Srivastava [facility] BS AMB   4/22/2024 10:45 AM Olivier Bob MD [facility] BS AMB       Requested Prescriptions     Pending Prescriptions Disp Refills    Insulin Glargine-yfgn (SEMGLEE, YFGN,) 100 UNIT/ML SOPN 15 mL 2     Sig: Inject 48 Units into the skin daily Semglee vial 10ml

## 2024-01-21 NOTE — PROGRESS NOTES
questions or concerns.    Notifications of recommendations will be sent to Olivier Lewis MD for review.    Patient will return to clinic in 8 week(s) for follow up.     Thank you for the consult,  Esther Srivastava, PharmD, BCACP, [facility]        For Pharmacy Admin Tracking Only    Program: Medical Group  CPA in place:  Yes  Recommendation Provided To: Patient/Caregiver: 5 via In person  Intervention Detail: Adherence Monitorin, Dose Adjustment: 1, reason: Therapy Optimization, Lab(s) Ordered, Scheduled Appointment, and Vaccine Recommended/Administered  Intervention Accepted By: Patient/Caregiver: 5  Gap Closed?: Yes   Time Spent (min):  40

## 2024-01-22 ENCOUNTER — PHARMACY VISIT (OUTPATIENT)
Age: 49
End: 2024-01-22
Payer: COMMERCIAL

## 2024-01-22 VITALS
SYSTOLIC BLOOD PRESSURE: 138 MMHG | HEIGHT: 73 IN | HEART RATE: 72 BPM | BODY MASS INDEX: 32.47 KG/M2 | DIASTOLIC BLOOD PRESSURE: 81 MMHG | OXYGEN SATURATION: 98 % | WEIGHT: 245 LBS

## 2024-01-22 DIAGNOSIS — E11.29 TYPE 2 DIABETES MELLITUS WITH OTHER DIABETIC KIDNEY COMPLICATION (HCC): Primary | ICD-10-CM

## 2024-01-22 LAB — HBA1C MFR BLD: 7.3 %

## 2024-01-22 PROCEDURE — 83036 HEMOGLOBIN GLYCOSYLATED A1C: CPT | Performed by: PHARMACIST

## 2024-01-22 NOTE — PATIENT INSTRUCTIONS
Your A1c today was 7.3%. Your goal is to be below 7%.  Increase your Farxiga to 10 mg daily. Complete your current supply by taking two tablets daily.  Call when you are about to run low on this medication. Stay hydrated.     Continue Semglee 48 units daily. Share savings coupon with pharmacy.   Continue metformin 1000 mg twice daily  Check more afternoon blood sugar readings    Check with your pharmacy about your annual flu shot

## 2024-01-29 RX ORDER — INSULIN GLARGINE-YFGN 100 [IU]/ML
48 INJECTION, SOLUTION SUBCUTANEOUS DAILY
Qty: 15 ML | Refills: 2 | Status: SHIPPED | OUTPATIENT
Start: 2024-01-29

## 2024-01-29 NOTE — TELEPHONE ENCOUNTER
Future Appointments:  Future Appointments   Date Time Provider Department Center   3/7/2024 10:45 AM Esther Srivastava [facility] BS AMB   4/22/2024 10:45 AM Olivier Bob MD [facility] BS AMB        Last Appointment With Me:  10/23/2023     Requested Prescriptions     Pending Prescriptions Disp Refills    Insulin Glargine-yfgn (SEMGLEE, YFGN,) 100 UNIT/ML SOPN 15 mL 2     Sig: Inject 48 Units into the skin daily Semglee vial 10ml

## 2024-02-21 RX ORDER — METFORMIN HYDROCHLORIDE 500 MG/1
1000 TABLET, EXTENDED RELEASE ORAL 2 TIMES DAILY
Qty: 360 TABLET | Refills: 3 | Status: SHIPPED | OUTPATIENT
Start: 2024-02-21

## 2024-02-23 ENCOUNTER — TELEPHONE (OUTPATIENT)
Age: 49
End: 2024-02-23

## 2024-02-23 RX ORDER — DAPAGLIFLOZIN 10 MG/1
10 TABLET, FILM COATED ORAL EVERY MORNING
Qty: 90 TABLET | Refills: 0 | Status: SHIPPED | OUTPATIENT
Start: 2024-02-23

## 2024-02-23 NOTE — TELEPHONE ENCOUNTER
Patient states he was advised by Maliha to contact when refill needed for medication & would get prescription done for increased dosage to Dapagliflozin (FARXIGA) 10 MG tablet. o be done thru [facility] as Patient is currently taking the 5 mg dose. Please call to discuss & advise. Thank you

## 2024-02-23 NOTE — TELEPHONE ENCOUNTER
Pharmacy Progress Note     Order for Mr. Rick MOORE Stephon Jr 48 y.o. 's new Farxiga dosage sent to [facility] mail order pharmacy.     Medications Discontinued During This Encounter   Medication Reason    dapagliflozin (FARXIGA) 5 MG tablet DOSE ADJUSTMENT     Orders Placed This Encounter    dapagliflozin (FARXIGA) 10 MG tablet     Sig: Take 1 tablet by mouth every morning     Dispense:  90 tablet     Refill:  0            Thank you for the consult,  Esther Srivastava PharmD, BCACP, CDCES                For Pharmacy Admin Tracking Only    Program: Medical Group  CPA in place:  Yes  Recommendation Provided To: Patient/Caregiver: 2 via Telephone  Intervention Detail: Discontinued Rx: 1, reason: Therapy Complete and New Rx: 1, reason: Needs Additional Therapy  Intervention Accepted By: Patient/Caregiver: 2  Gap Closed?: Yes   Time Spent (min): 5

## 2024-03-07 ENCOUNTER — PHARMACY VISIT (OUTPATIENT)
Age: 49
End: 2024-03-07

## 2024-03-07 DIAGNOSIS — E11.29 TYPE 2 DIABETES MELLITUS WITH OTHER DIABETIC KIDNEY COMPLICATION (HCC): Primary | ICD-10-CM

## 2024-03-07 NOTE — PROGRESS NOTES
Pharmacy Progress Note - Diabetes Management    Assessment / Plan:   Diabetes Management:  - Per ADA guidelines, Pt's A1c is not at goal of < 7%.   - Appears to be tolerating dosage change well.    - Continue Semglee 48 units daily.   - Continue Farxiga 10 mg daily  - Continue metformin 1 gm BID  - Will follow up with patient at the next PCP appt in April.      S/O: Mr. Rick Szymanski Jr is a 48  y.o. male, referred by Olivier Lweis MD, with a PMH of T2DM, HLD, HTN, was seen virtually today for diabetes management follow up.  Patient's last A1c was 7.3% (Jan 2024, Oct 2023), 7.5% (Aug 2023), 8.2% (April 2023), 9.6% (Dec 2022), 11% (July 2022), 8.3% (April 2022), 8.8% (April 2018), 14.6% (Jan 2016) .   PHI verified.     Interim update:   Recall Farxiga increased to 10 mg daily.  Denies any issues other than increased urinary frequency. Staying hydrated.     Current anti-hyperglycemic regimen include(s):    - Metformin 1 gm BID  - Farxiga 10 mg daily  - Semglee 48 units daily  -- has enough insulin.     - ASA 81 mg daily,   - Valsartan/HCTZ 160/25 mg daily, Amlodipine 5 mg daily  - LDL 96 ;  (Dec 2022)    ROS:  Today, Pt endorses:  - Symptoms of Hyperglycemia: none  - Symptoms of Hypoglycemia: none    Blood Glucose Monitoring (BGM) or CGM:  Yesterday's PM reading 120   Denies any readings in the 200s.   Lowest reading called was 110.  Highest reading was 160.      Vitals:  Wt Readings from Last 3 Encounters:   01/22/24 111.1 kg (245 lb)   10/23/23 112.7 kg (248 lb 6.4 oz)   08/07/23 112.5 kg (248 lb)     BP Readings from Last 3 Encounters:   01/22/24 138/81   10/23/23 126/86   08/07/23 132/89     Pulse Readings from Last 3 Encounters:   01/22/24 72   10/23/23 73   08/07/23 85       Past Medical History:   Diagnosis Date    Diabetes (HCC)      No Known Allergies    Current Outpatient Medications   Medication Sig    dapagliflozin (FARXIGA) 10 MG tablet Take 1 tablet by mouth every morning    metFORMIN

## 2024-03-18 RX ORDER — AMLODIPINE BESYLATE 5 MG/1
5 TABLET ORAL DAILY
Qty: 90 TABLET | Refills: 3 | Status: SHIPPED | OUTPATIENT
Start: 2024-03-18

## 2024-04-19 NOTE — PROGRESS NOTES
Pharmacy Progress Note - Diabetes Management    Assessment / Plan:   Diabetes Management:  - Per ADA guidelines, Pt's A1c is not at goal of < 7%.  BP closer to goal <130/80.   - Reported BGM within fasting and post prandial goals. Elevated fasting BG today contributed by last night's high carb meal.   - Continue Semglee 48 units daily  - Continue metformin 1 gm BID  - Continue Farxiga 10 mg daily  - Emphasize importance of limiting high carb meal choices.  Patient is agreeable to this   - Discuss impact of persistent hyperglycemia on ocular complications  - Patient will complete labs today      S/O: Mr. Rick Szymanski Jr is a 48  y.o. male, referred by Olivier Lewis MD, with a PMH of T2DM, HLD, HTN, was seen today for diabetes management follow up.  Patient's last A1c was 7.3% (Jan 2024, Oct 2023), 7.5% (Aug 2023), 8.2% (April 2023), 9.6% (Dec 2022), 11% (July 2022), 8.3% (April 2022), 8.8% (April 2018), 14.6% (Jan 2016) .     Patient seen following his PCP appt today     Current anti-hyperglycemic regimen include(s):    - Metformin 1 gm BID  - Farxiga 10 mg daily  - Semglee pen -- 48 units daily     - ASA 81 mg daily,   - Valsartan/HCTZ 160/25 mg daily, Amlodipine 5 mg daily  - LDL 96 ;  (Dec 2022)    ROS:  Today, Pt endorses:  - Symptoms of Hyperglycemia: none  - Symptoms of Hypoglycemia: none    Blood Glucose Monitoring (BGM) or CGM:  No meter or log today  Highest recall was 160   Reports AM readings: 120-140s  Other times of the day: 110s or lower. Denies BG <80    Nutrition/Lifestyle Modifications:  Reports to having 1 [employer] beef sandwich + juice for lunch and dinner last night.  Generally has water with meals     Receives L eye injections - every 2 months -- last injection was March 22nd. Next visit in May   [facility].     The 10-year ASCVD risk score (Ariana MIRAMONTES, et al., 2019) is: 14.1%       Vitals:  Wt Readings from Last 3 Encounters:   04/22/24 113.3 kg (249 lb 12.8 oz)   01/22/24 111.1 kg (245

## 2024-04-20 NOTE — PROGRESS NOTES
HISTORY OF PRESENT ILLNESS   Rick Szymanski Jr   is a 48 y.o.  male.   Here  for f/u DM-2 with microalbuminuria and retinopathy on insulin obesity HTN and  dyslipidemia .      Last A1c 7.3  Fsbs on farxiga metformin and semglee 48 u-110-130  Sees Eye regularly-eye injections-had appt last month .  No cp sob or symptoms of neuropathy in feet    Home BP wnl      Last OV  Recent a1c down to 7.5-seeing Pharm D on farxiga 5 mg qd metformin 1 gm bid and semglee 48 units qd  Amb a1c 7.3  Fsbs-around 120--this AM 98     Home BP on norvasc and diovan /hct  Sees eye MD esquivel  Patient Active Problem List    Diagnosis Date Noted    Obesity (BMI 30-39.9) 10/20/2023    Diabetic retinopathy associated with type 2 diabetes mellitus (HCC) 04/21/2023    HTN (hypertension) 04/21/2023    Microalbuminuria 04/21/2023    Diabetes mellitus (HCC) 09/26/2011     Current Outpatient Medications   Medication Sig Dispense Refill    amLODIPine (NORVASC) 5 MG tablet TAKE 1 TABLET BY MOUTH EVERY DAY 90 tablet 3    dapagliflozin (FARXIGA) 10 MG tablet Take 1 tablet by mouth every morning 90 tablet 0    metFORMIN (GLUCOPHAGE-XR) 500 MG extended release tablet TAKE 2 TABLETS TWICE A  tablet 3    Insulin Glargine-yfgn (SEMGLEE, YFGN,) 100 UNIT/ML SOPN Inject 48 Units into the skin daily Semglee vial 10ml 15 mL 2    insulin glargine (LANTUS SOLOSTAR) 100 UNIT/ML injection pen Inject 48 Units into the skin daily 5 Adjustable Dose Pre-filled Pen Syringe 3    Insulin Pen Needle (BD PEN NEEDLE JATINDER U/F) 32G X 4 MM MISC 1 each by Does not apply route daily Use to give insulin under the skin daily E11.65 100 each 2    valsartan-hydroCHLOROthiazide (DIOVAN-HCT) 160-25 MG per tablet TAKE 1 TABLET DAILY 90 tablet 3    aspirin 81 MG EC tablet Take 1 tablet by mouth daily      tadalafil (CIALIS) 10 MG tablet Take 1 tablet by mouth as needed       No current facility-administered medications for this visit.     No Known Allergies  Social History     Tobacco

## 2024-04-22 ENCOUNTER — OFFICE VISIT (OUTPATIENT)
Age: 49
End: 2024-04-22
Payer: COMMERCIAL

## 2024-04-22 ENCOUNTER — PHARMACY VISIT (OUTPATIENT)
Age: 49
End: 2024-04-22

## 2024-04-22 VITALS
SYSTOLIC BLOOD PRESSURE: 130 MMHG | BODY MASS INDEX: 33.11 KG/M2 | HEART RATE: 76 BPM | DIASTOLIC BLOOD PRESSURE: 82 MMHG | RESPIRATION RATE: 16 BRPM | OXYGEN SATURATION: 95 % | HEIGHT: 73 IN | TEMPERATURE: 97.2 F | WEIGHT: 249.8 LBS

## 2024-04-22 DIAGNOSIS — E11.319 TYPE 2 DIABETES MELLITUS WITH RETINOPATHY OF BOTH EYES, WITH LONG-TERM CURRENT USE OF INSULIN, MACULAR EDEMA PRESENCE UNSPECIFIED, UNSPECIFIED RETINOPATHY SEVERITY (HCC): ICD-10-CM

## 2024-04-22 DIAGNOSIS — Z79.4 TYPE 2 DIABETES MELLITUS WITH RETINOPATHY OF BOTH EYES, WITH LONG-TERM CURRENT USE OF INSULIN, MACULAR EDEMA PRESENCE UNSPECIFIED, UNSPECIFIED RETINOPATHY SEVERITY (HCC): ICD-10-CM

## 2024-04-22 DIAGNOSIS — I10 HYPERTENSION, UNSPECIFIED TYPE: ICD-10-CM

## 2024-04-22 DIAGNOSIS — E11.29 TYPE 2 DIABETES MELLITUS WITH OTHER DIABETIC KIDNEY COMPLICATION (HCC): Primary | ICD-10-CM

## 2024-04-22 PROCEDURE — 99214 OFFICE O/P EST MOD 30 MIN: CPT | Performed by: INTERNAL MEDICINE

## 2024-04-22 PROCEDURE — 3075F SYST BP GE 130 - 139MM HG: CPT | Performed by: INTERNAL MEDICINE

## 2024-04-22 PROCEDURE — 3079F DIAST BP 80-89 MM HG: CPT | Performed by: INTERNAL MEDICINE

## 2024-04-22 RX ORDER — INSULIN GLARGINE-YFGN 100 [IU]/ML
48 INJECTION, SOLUTION SUBCUTANEOUS DAILY
Qty: 15 ML | Refills: 5 | Status: SHIPPED | OUTPATIENT
Start: 2024-04-22

## 2024-04-22 ASSESSMENT — PATIENT HEALTH QUESTIONNAIRE - PHQ9
SUM OF ALL RESPONSES TO PHQ QUESTIONS 1-9: 0
SUM OF ALL RESPONSES TO PHQ9 QUESTIONS 1 & 2: 0
SUM OF ALL RESPONSES TO PHQ QUESTIONS 1-9: 0
2. FEELING DOWN, DEPRESSED OR HOPELESS: NOT AT ALL
SUM OF ALL RESPONSES TO PHQ QUESTIONS 1-9: 0
SUM OF ALL RESPONSES TO PHQ QUESTIONS 1-9: 0
1. LITTLE INTEREST OR PLEASURE IN DOING THINGS: NOT AT ALL

## 2024-04-22 NOTE — PROGRESS NOTES
Chief Complaint   Patient presents with    Diabetes     6 month follow up        \"Have you been to the ER, urgent care clinic since your last visit?  Hospitalized since your last visit?\"    NO    “Have you seen or consulted any other health care providers outside of [facility] since your last visit?”    NO            Click Here for Release of Records Request

## 2024-04-22 NOTE — PATIENT INSTRUCTIONS
Let's get labs today  Continue Semglee 48 units daily  Continue Farxiga 10 mg daily  Continue metformin 1000 mg twice daily  Bring meter to the next visit

## 2024-04-23 LAB
ANION GAP SERPL CALC-SCNC: 5 MMOL/L (ref 5–15)
BUN SERPL-MCNC: 17 MG/DL (ref 6–20)
BUN/CREAT SERPL: 13 (ref 12–20)
CALCIUM SERPL-MCNC: 8.9 MG/DL (ref 8.5–10.1)
CHLORIDE SERPL-SCNC: 107 MMOL/L (ref 97–108)
CHOLEST SERPL-MCNC: 160 MG/DL
CO2 SERPL-SCNC: 28 MMOL/L (ref 21–32)
CREAT SERPL-MCNC: 1.29 MG/DL (ref 0.7–1.3)
CREAT UR-MCNC: 146 MG/DL
EST. AVERAGE GLUCOSE BLD GHB EST-MCNC: 180 MG/DL
GLUCOSE SERPL-MCNC: 99 MG/DL (ref 65–100)
HBA1C MFR BLD: 7.9 % (ref 4–5.6)
HDLC SERPL: 3.2 (ref 0–5)
LDLC SERPL CALC-MCNC: 91 MG/DL (ref 0–100)
MICROALBUMIN UR-MCNC: 4.03 MG/DL
MICROALBUMIN/CREAT UR-RTO: 28 MG/G (ref 0–30)
POTASSIUM SERPL-SCNC: 4.1 MMOL/L (ref 3.5–5.1)
SODIUM SERPL-SCNC: 140 MMOL/L (ref 136–145)
TRIGL SERPL-MCNC: 95 MG/DL
VLDLC SERPL CALC-MCNC: 19 MG/DL

## 2024-05-07 RX ORDER — DAPAGLIFLOZIN 10 MG/1
10 TABLET, FILM COATED ORAL EVERY MORNING
Qty: 90 TABLET | Refills: 3 | Status: SHIPPED | OUTPATIENT
Start: 2024-05-07

## 2024-05-07 NOTE — TELEPHONE ENCOUNTER
Received faxed refill request from pharmacy      PCP: Olivier Bob MD    Last appt: 4/22/2024  Future Appointments   Date Time Provider Department Center   7/8/2024 10:30 AM Esther Srivastava, [facility] BS AMB       Requested Prescriptions     Pending Prescriptions Disp Refills    dapagliflozin (FARXIGA) 10 MG tablet 90 tablet 0     Sig: Take 1 tablet by mouth every morning

## 2024-07-07 NOTE — PROGRESS NOTES
Pharmacy Progress Note - Diabetes Management    Assessment / Plan:   Diabetes Management:  - Per ADA guidelines, Pt's A1c is not at goal of < 7%.   - Review recent lab results.  Available BG readings remain elevated for fasting and post prandial goals  - Reinforce consistent SMBG checks.   - Given recent A1c and SMBG patterns, stop Semglee insulin.  Start Tresiba at 48 units daily. If FBG > 130, increase dose to 50 units daily. Mfr savings card provided  - Continue metformin 1 gm BID  - Continue Farxiga 10 mg daily. Increase water hydration. Reduce juice intake.  - Encourage patient to increase amounts/duration of current physical activity.      S/O: Mr. Rick Szymanski Jr is a 48  y.o. male, referred by Olivier Lewis MD, with a PMH of T2DM, HLD, HTN, was seen today for diabetes management follow up.  Patient's last A1c was 7.9% (April 2024), 7.3% (Jan 2024, Oct 2023), 7.5% (Aug 2023), 8.2% (April 2023), 9.6% (Dec 2022), 11% (July 2022), 8.3% (April 2022), 8.8% (April 2018), 14.6% (Jan 2016) .     Current anti-hyperglycemic regimen include(s):    - Metformin 1 gm BID  - Farxiga 10 mg daily  - Semglee pen -- 48 units daily      - ASA 81 mg daily,   - Valsartan/HCTZ 160/25 mg daily, Amlodipine 5 mg daily  - LDL 91 ; TG 95 (April 2024)     ROS:  Today, Pt endorses:  - Symptoms of Hyperglycemia: none  - Symptoms of Hypoglycemia: none    Blood Glucose Monitoring (BGM) or CGM:  Brought in meter. Not checking daily.  Recent readings: 185, 254, 174,156,142  257,212,210,188,171,208,216    Nutrition/Lifestyle Modifications:  Eating 2 meals/day + occasional snacking  Breakfast - early today -- 1 salad w/ eggs, ranch dressing   Dinner last night: spaghetti + juice (diluted with water)     Reports to running on the treadmill for 30 mins every Saturday -- has access to this at work  Active at work.    Ophthalmology f/ed by [facility]    The 10-year ASCVD risk score (Ariana MIRAMONTES, et al., 2019) is: 12.7%       Vitals:  Wt Readings from

## 2024-07-08 ENCOUNTER — PHARMACY VISIT (OUTPATIENT)
Age: 49
End: 2024-07-08

## 2024-07-08 VITALS
SYSTOLIC BLOOD PRESSURE: 119 MMHG | DIASTOLIC BLOOD PRESSURE: 76 MMHG | WEIGHT: 247 LBS | HEART RATE: 95 BPM | OXYGEN SATURATION: 97 % | BODY MASS INDEX: 32.74 KG/M2 | HEIGHT: 73 IN

## 2024-07-08 DIAGNOSIS — E11.29 TYPE 2 DIABETES MELLITUS WITH OTHER DIABETIC KIDNEY COMPLICATION (HCC): Primary | ICD-10-CM

## 2024-07-08 DIAGNOSIS — E11.319 TYPE 2 DIABETES MELLITUS WITH RETINOPATHY OF BOTH EYES, WITH LONG-TERM CURRENT USE OF INSULIN, MACULAR EDEMA PRESENCE UNSPECIFIED, UNSPECIFIED RETINOPATHY SEVERITY (HCC): ICD-10-CM

## 2024-07-08 DIAGNOSIS — Z79.4 TYPE 2 DIABETES MELLITUS WITH RETINOPATHY OF BOTH EYES, WITH LONG-TERM CURRENT USE OF INSULIN, MACULAR EDEMA PRESENCE UNSPECIFIED, UNSPECIFIED RETINOPATHY SEVERITY (HCC): ICD-10-CM

## 2024-07-08 RX ORDER — GLUCOSAMINE HCL/CHONDROITIN SU 500-400 MG
CAPSULE ORAL
Qty: 300 STRIP | Refills: 6 | Status: SHIPPED | OUTPATIENT
Start: 2024-07-08

## 2024-07-08 RX ORDER — INSULIN DEGLUDEC 100 U/ML
50 INJECTION, SOLUTION SUBCUTANEOUS DAILY
Qty: 15 ML | Refills: 1 | Status: SHIPPED | OUTPATIENT
Start: 2024-07-08

## 2024-07-08 RX ORDER — PEN NEEDLE, DIABETIC 32GX 5/32"
1 NEEDLE, DISPOSABLE MISCELLANEOUS DAILY
Qty: 100 EACH | Refills: 2 | Status: SHIPPED | OUTPATIENT
Start: 2024-07-08

## 2024-07-08 NOTE — PATIENT INSTRUCTIONS
- Stop Semglee  - Start Tresiba 48 units daily. If fasting blood sugar remains above 130, increase dose to 50 units daily.   - Continue Farxiga 10 mg daily  - Continue metformin 1000 mg twice daily  - Drink more water   - Check blood sugar more consistently.

## 2024-07-25 ENCOUNTER — TELEPHONE (OUTPATIENT)
Age: 49
End: 2024-07-25

## 2024-07-25 NOTE — TELEPHONE ENCOUNTER
Patient states he needs a call back in reference to New Insulin & that his Blood Sugar seems to be testing higher since started. Please call back to discuss Plan of Care. Thank you

## 2024-07-29 NOTE — TELEPHONE ENCOUNTER
Pharmacy Progress Note - Telephone Call    Mr. Rick Szymanski Jr 48 y.o. was contacted via an outbound telephone call regarding his previous call today.  A voicemail was left for patient to return my call.       Thank you,  Esther Srivastava, PharmD, BCACP, [facility]      For Pharmacy Admin Tracking Only    Program: Medical Group  CPA in place:  Yes  Recommendation Provided To: Patient/Caregiver: 1 via Telephone  Time Spent (min): 5

## 2024-07-30 ENCOUNTER — TELEPHONE (OUTPATIENT)
Age: 49
End: 2024-07-30

## 2024-07-30 NOTE — TELEPHONE ENCOUNTER
Pharmacy Progress Note - Telephone Encounter    S/O: Mr. Rick Szymanski Jr 48 y.o. male contacted office/me via an inbound telephone call to discuss his previous call today. Verified patient’s identifiers (name & ) per HIPAA policy.     - Reports increasing Tresiba to 50 units daily  - Remains on metformin and Farxiga  - Reports recent readings remain in the 200s -- yesterday was 230.     Hemoglobin A1C   Date Value Ref Range Status   2024 7.9 (H) 4.0 - 5.6 % Final     Comment:     (NOTE)  HbA1C Interpretive Ranges  <5.7              Normal  5.7 - 6.4         Consider Prediabetes  >6.5              Consider Diabetes       Hemoglobin A1C, POC   Date Value Ref Range Status   2024 7.3 % Final     Estimated Creatinine Clearance: 91 mL/min (based on SCr of 1.29 mg/dL).      A/P:  - Increase Tresiba to 55 units daily  - Continue metformin 1 gm BID, Farxiga 10 mg daily.  - Reach out if BG remains >200 in the next 1-2 weeks  - Patient endorses understanding to the provided information. All questions answered at this time.        Thank you,  Esther Srivastava, PharmD, BCACP, CDCES      For Pharmacy Admin Tracking Only    Program: Medical Group  CPA in place:  Yes  Recommendation Provided To: Patient/Caregiver: 1 via Telephone  Intervention Detail: Dose Adjustment: 1, reason: Therapy Optimization  Intervention Accepted By: Patient/Caregiver: 1  Gap Closed?: Yes   Time Spent (min): 10

## 2024-09-05 RX ORDER — INSULIN DEGLUDEC 100 U/ML
50 INJECTION, SOLUTION SUBCUTANEOUS DAILY
Qty: 15 ADJUSTABLE DOSE PRE-FILLED PEN SYRINGE | Refills: 5 | Status: SHIPPED | OUTPATIENT
Start: 2024-09-05

## 2024-09-10 ENCOUNTER — PHARMACY VISIT (OUTPATIENT)
Age: 49
End: 2024-09-10
Payer: COMMERCIAL

## 2024-09-10 VITALS
BODY MASS INDEX: 33 KG/M2 | SYSTOLIC BLOOD PRESSURE: 129 MMHG | OXYGEN SATURATION: 98 % | HEART RATE: 75 BPM | WEIGHT: 249 LBS | DIASTOLIC BLOOD PRESSURE: 81 MMHG | HEIGHT: 73 IN

## 2024-09-10 DIAGNOSIS — E11.319 TYPE 2 DIABETES MELLITUS WITH RETINOPATHY OF BOTH EYES, WITH LONG-TERM CURRENT USE OF INSULIN, MACULAR EDEMA PRESENCE UNSPECIFIED, UNSPECIFIED RETINOPATHY SEVERITY (HCC): ICD-10-CM

## 2024-09-10 DIAGNOSIS — Z79.4 TYPE 2 DIABETES MELLITUS WITH RETINOPATHY OF BOTH EYES, WITH LONG-TERM CURRENT USE OF INSULIN, MACULAR EDEMA PRESENCE UNSPECIFIED, UNSPECIFIED RETINOPATHY SEVERITY (HCC): ICD-10-CM

## 2024-09-10 DIAGNOSIS — E11.29 TYPE 2 DIABETES MELLITUS WITH OTHER DIABETIC KIDNEY COMPLICATION (HCC): Primary | ICD-10-CM

## 2024-09-10 LAB — HBA1C MFR BLD: 9.1 %

## 2024-09-10 PROCEDURE — 83036 HEMOGLOBIN GLYCOSYLATED A1C: CPT | Performed by: PHARMACIST

## 2024-10-28 ENCOUNTER — OFFICE VISIT (OUTPATIENT)
Age: 49
End: 2024-10-28
Payer: COMMERCIAL

## 2024-10-28 VITALS
SYSTOLIC BLOOD PRESSURE: 124 MMHG | TEMPERATURE: 97.2 F | BODY MASS INDEX: 33.77 KG/M2 | WEIGHT: 254.8 LBS | OXYGEN SATURATION: 98 % | RESPIRATION RATE: 16 BRPM | HEART RATE: 74 BPM | DIASTOLIC BLOOD PRESSURE: 82 MMHG | HEIGHT: 73 IN

## 2024-10-28 DIAGNOSIS — Z79.4 TYPE 2 DIABETES MELLITUS WITH RETINOPATHY, WITH LONG-TERM CURRENT USE OF INSULIN, MACULAR EDEMA PRESENCE UNSPECIFIED, UNSPECIFIED LATERALITY, UNSPECIFIED RETINOPATHY SEVERITY (HCC): ICD-10-CM

## 2024-10-28 DIAGNOSIS — E11.319 TYPE 2 DIABETES MELLITUS WITH RETINOPATHY, WITH LONG-TERM CURRENT USE OF INSULIN, MACULAR EDEMA PRESENCE UNSPECIFIED, UNSPECIFIED LATERALITY, UNSPECIFIED RETINOPATHY SEVERITY (HCC): ICD-10-CM

## 2024-10-28 DIAGNOSIS — Z00.00 ROUTINE PHYSICAL EXAMINATION: ICD-10-CM

## 2024-10-28 DIAGNOSIS — I10 HYPERTENSION, UNSPECIFIED TYPE: ICD-10-CM

## 2024-10-28 DIAGNOSIS — E66.811 OBESITY (BMI 30.0-34.9): ICD-10-CM

## 2024-10-28 DIAGNOSIS — Z00.00 ROUTINE PHYSICAL EXAMINATION: Primary | ICD-10-CM

## 2024-10-28 LAB
ALBUMIN SERPL-MCNC: 4 G/DL (ref 3.5–5)
ALBUMIN/GLOB SERPL: 1.2 (ref 1.1–2.2)
ALP SERPL-CCNC: 113 U/L (ref 45–117)
ALT SERPL-CCNC: 48 U/L (ref 12–78)
ANION GAP SERPL CALC-SCNC: 9 MMOL/L (ref 2–12)
AST SERPL-CCNC: 21 U/L (ref 15–37)
BILIRUB SERPL-MCNC: 0.5 MG/DL (ref 0.2–1)
BUN SERPL-MCNC: 13 MG/DL (ref 6–20)
BUN/CREAT SERPL: 10 (ref 12–20)
CALCIUM SERPL-MCNC: 9.6 MG/DL (ref 8.5–10.1)
CHLORIDE SERPL-SCNC: 104 MMOL/L (ref 97–108)
CHOLEST SERPL-MCNC: 159 MG/DL
CO2 SERPL-SCNC: 27 MMOL/L (ref 21–32)
CREAT SERPL-MCNC: 1.24 MG/DL (ref 0.7–1.3)
ERYTHROCYTE [DISTWIDTH] IN BLOOD BY AUTOMATED COUNT: 12 % (ref 11.5–14.5)
EST. AVERAGE GLUCOSE BLD GHB EST-MCNC: 235 MG/DL
GLOBULIN SER CALC-MCNC: 3.3 G/DL (ref 2–4)
GLUCOSE SERPL-MCNC: 137 MG/DL (ref 65–100)
HBA1C MFR BLD: 9.8 % (ref 4–5.6)
HCT VFR BLD AUTO: 46.9 % (ref 36.6–50.3)
HDLC SERPL-MCNC: 50 MG/DL
HDLC SERPL: 3.2 (ref 0–5)
HGB BLD-MCNC: 16 G/DL (ref 12.1–17)
LDLC SERPL CALC-MCNC: 89.6 MG/DL (ref 0–100)
MCH RBC QN AUTO: 30.1 PG (ref 26–34)
MCHC RBC AUTO-ENTMCNC: 34.1 G/DL (ref 30–36.5)
MCV RBC AUTO: 88.2 FL (ref 80–99)
NRBC # BLD: 0 K/UL (ref 0–0.01)
NRBC BLD-RTO: 0 PER 100 WBC
PLATELET # BLD AUTO: 196 K/UL (ref 150–400)
PMV BLD AUTO: 11.1 FL (ref 8.9–12.9)
POTASSIUM SERPL-SCNC: 4 MMOL/L (ref 3.5–5.1)
PROT SERPL-MCNC: 7.3 G/DL (ref 6.4–8.2)
RBC # BLD AUTO: 5.32 M/UL (ref 4.1–5.7)
SODIUM SERPL-SCNC: 140 MMOL/L (ref 136–145)
TRIGL SERPL-MCNC: 97 MG/DL
TSH SERPL DL<=0.05 MIU/L-ACNC: 1.35 UIU/ML (ref 0.36–3.74)
VLDLC SERPL CALC-MCNC: 19.4 MG/DL
WBC # BLD AUTO: 5.1 K/UL (ref 4.1–11.1)

## 2024-10-28 PROCEDURE — 3074F SYST BP LT 130 MM HG: CPT | Performed by: INTERNAL MEDICINE

## 2024-10-28 PROCEDURE — 99396 PREV VISIT EST AGE 40-64: CPT | Performed by: INTERNAL MEDICINE

## 2024-10-28 PROCEDURE — 3079F DIAST BP 80-89 MM HG: CPT | Performed by: INTERNAL MEDICINE

## 2024-10-28 RX ORDER — ASPIRIN 81 MG/1
1 TABLET, CHEWABLE ORAL
COMMUNITY

## 2024-10-28 SDOH — ECONOMIC STABILITY: FOOD INSECURITY: WITHIN THE PAST 12 MONTHS, YOU WORRIED THAT YOUR FOOD WOULD RUN OUT BEFORE YOU GOT MONEY TO BUY MORE.: NEVER TRUE

## 2024-10-28 SDOH — ECONOMIC STABILITY: FOOD INSECURITY: WITHIN THE PAST 12 MONTHS, THE FOOD YOU BOUGHT JUST DIDN'T LAST AND YOU DIDN'T HAVE MONEY TO GET MORE.: NEVER TRUE

## 2024-10-28 SDOH — ECONOMIC STABILITY: INCOME INSECURITY: HOW HARD IS IT FOR YOU TO PAY FOR THE VERY BASICS LIKE FOOD, HOUSING, MEDICAL CARE, AND HEATING?: NOT HARD AT ALL

## 2024-10-30 LAB
PSA SERPL-MCNC: 1.1 NG/ML (ref 0–4)
REFLEX CRITERIA: NORMAL

## 2024-11-02 NOTE — PROGRESS NOTES
Patient verbalized understanding of the information presented and all of the patient’s questions were answered.  AVS was handed to the patient. Patient advised to call the office with any additional questions or concerns.    Notifications of recommendations will be sent to Olivier Lewis MD for review.    Patient will return to clinic in 6 week(s) for follow up.     Thank you for the consult,  Esther Srivastava PharmD, BCACP, [facility]          For Pharmacy Admin Tracking Only    Program: Medical Group  CPA in place:  Yes  Recommendation Provided To: Patient/Caregiver: 8 via In person  Intervention Detail: Adherence Monitorin, Device Training, Discontinued Rx: 1, reason: Duplicate Therapy, Dose Adjustment: 2, reason: Therapy Optimization, New Rx: 1, reason: Needs Additional Therapy, Patient Access Assistance/Sample Provided, and Scheduled Appointment  Intervention Accepted By: Patient/Caregiver: 8  Gap Closed?: Yes   Time Spent (min):  35

## 2024-11-05 ENCOUNTER — PHARMACY VISIT (OUTPATIENT)
Age: 49
End: 2024-11-05

## 2024-11-05 VITALS — HEIGHT: 73 IN | BODY MASS INDEX: 33.8 KG/M2 | WEIGHT: 255 LBS

## 2024-11-05 DIAGNOSIS — E11.319 TYPE 2 DIABETES MELLITUS WITH RETINOPATHY, WITH LONG-TERM CURRENT USE OF INSULIN, MACULAR EDEMA PRESENCE UNSPECIFIED, UNSPECIFIED LATERALITY, UNSPECIFIED RETINOPATHY SEVERITY (HCC): Primary | ICD-10-CM

## 2024-11-05 DIAGNOSIS — Z79.4 TYPE 2 DIABETES MELLITUS WITH RETINOPATHY, WITH LONG-TERM CURRENT USE OF INSULIN, MACULAR EDEMA PRESENCE UNSPECIFIED, UNSPECIFIED LATERALITY, UNSPECIFIED RETINOPATHY SEVERITY (HCC): Primary | ICD-10-CM

## 2024-11-05 RX ORDER — SEMAGLUTIDE 0.68 MG/ML
0.25 INJECTION, SOLUTION SUBCUTANEOUS WEEKLY
Qty: 3 ML | Refills: 0 | Status: SHIPPED | COMMUNITY
Start: 2024-11-05

## 2024-11-05 RX ORDER — INSULIN DEGLUDEC 100 U/ML
55 INJECTION, SOLUTION SUBCUTANEOUS DAILY
Qty: 15 ADJUSTABLE DOSE PRE-FILLED PEN SYRINGE | Refills: 3 | Status: SHIPPED | OUTPATIENT
Start: 2024-11-05

## 2024-11-05 NOTE — PATIENT INSTRUCTIONS
Your A1c was 9.8%.   Start Ozempic at  0.25 mg weekly for 4 weeks, then increase dose to 0.5 mg at week 5.   Change Tresiba to 55 units daily  Continue metformin 1000 mg twice daily  Continue Farxiga 10 mg daily

## 2024-11-22 RX ORDER — VALSARTAN AND HYDROCHLOROTHIAZIDE 160; 25 MG/1; MG/1
1 TABLET ORAL DAILY
Qty: 90 TABLET | Refills: 3 | Status: SHIPPED | OUTPATIENT
Start: 2024-11-22

## 2024-12-13 RX ORDER — TADALAFIL 10 MG/1
10 TABLET ORAL DAILY PRN
Qty: 56 TABLET | Refills: 1 | Status: SHIPPED | OUTPATIENT
Start: 2024-12-13

## 2024-12-21 NOTE — PROGRESS NOTES
LABGLOM 71 10/28/2024    GFRAA >60 07/12/2022    AGRATIO 1.2 04/21/2023    GLOB 3.3 10/28/2024       Lab Results   Component Value Date    CHOL 159 10/28/2024    TRIG 97 10/28/2024    HDL 50 10/28/2024    VLDL 19.4 10/28/2024    CHOLHDLRATIO 3.2 10/28/2024       Lab Results   Component Value Date    WBC 5.1 10/28/2024    HGB 16.0 10/28/2024    HCT 46.9 10/28/2024    MCV 88.2 10/28/2024     10/28/2024       No results found for: \"MALBCR\"      Last HbA1c(s):  Hemoglobin A1C   Date Value Ref Range Status   10/28/2024 9.8 (H) 4.0 - 5.6 % Final     Comment:     (NOTE)  HbA1C Interpretive Ranges  <5.7              Normal  5.7 - 6.4         Consider Prediabetes  >6.5              Consider Diabetes       Hemoglobin A1C, POC   Date Value Ref Range Status   09/10/2024 9.1 % Final       Hemoglobin A1C   Date Value Ref Range Status   10/28/2024 9.8 (H) 4.0 - 5.6 % Final     Comment:     (NOTE)  HbA1C Interpretive Ranges  <5.7              Normal  5.7 - 6.4         Consider Prediabetes  >6.5              Consider Diabetes     04/22/2024 7.9 (H) 4.0 - 5.6 % Final     Comment:     (NOTE)  HbA1C Interpretive Ranges  <5.7              Normal  5.7 - 6.4         Consider Prediabetes  >6.5              Consider Diabetes     04/21/2023 8.2 (H) 4.0 - 5.6 % Final     Comment:     NEW METHOD  PLEASE NOTE NEW REFERENCE RANGE  (NOTE)  HbA1C Interpretive Ranges  <5.7              Normal  5.7 - 6.4         Consider Prediabetes  >6.5              Consider Diabetes           Estimated Creatinine Clearance: 94 mL/min (based on SCr of 1.24 mg/dL).      Medication reconciliation was completed during the visit.    Medications Discontinued During This Encounter   Medication Reason    Semaglutide,0.25 or 0.5MG/DOS, (OZEMPIC, 0.25 OR 0.5 MG/DOSE,) 2 MG/3ML SOPN DOSE ADJUSTMENT    Insulin Degludec (TRESIBA FLEXTOUCH) 100 UNIT/ML SOPN      Orders Placed This Encounter    Semaglutide,0.25 or 0.5MG/DOS, (OZEMPIC, 0.25 OR 0.5 MG/DOSE,) 2 MG/3ML SOPN

## 2024-12-24 ENCOUNTER — PHARMACY VISIT (OUTPATIENT)
Age: 49
End: 2024-12-24

## 2024-12-24 VITALS
OXYGEN SATURATION: 99 % | SYSTOLIC BLOOD PRESSURE: 132 MMHG | DIASTOLIC BLOOD PRESSURE: 84 MMHG | HEIGHT: 73 IN | WEIGHT: 250 LBS | BODY MASS INDEX: 33.13 KG/M2 | HEART RATE: 77 BPM

## 2024-12-24 DIAGNOSIS — E11.319 TYPE 2 DIABETES MELLITUS WITH RETINOPATHY, WITH LONG-TERM CURRENT USE OF INSULIN, MACULAR EDEMA PRESENCE UNSPECIFIED, UNSPECIFIED LATERALITY, UNSPECIFIED RETINOPATHY SEVERITY (HCC): Primary | ICD-10-CM

## 2024-12-24 DIAGNOSIS — Z79.4 TYPE 2 DIABETES MELLITUS WITH RETINOPATHY, WITH LONG-TERM CURRENT USE OF INSULIN, MACULAR EDEMA PRESENCE UNSPECIFIED, UNSPECIFIED LATERALITY, UNSPECIFIED RETINOPATHY SEVERITY (HCC): Primary | ICD-10-CM

## 2024-12-24 RX ORDER — INSULIN DEGLUDEC 100 U/ML
50 INJECTION, SOLUTION SUBCUTANEOUS DAILY
Qty: 15 ADJUSTABLE DOSE PRE-FILLED PEN SYRINGE | Refills: 3
Start: 2024-12-24

## 2024-12-24 RX ORDER — SEMAGLUTIDE 0.68 MG/ML
0.5 INJECTION, SOLUTION SUBCUTANEOUS WEEKLY
Qty: 3 ML | Refills: 1 | Status: SHIPPED | OUTPATIENT
Start: 2024-12-24

## 2024-12-24 NOTE — PATIENT INSTRUCTIONS
Decrease Tresiba insulin to 50 units daily. If you have more than 3 blood sugar readings below 80, decrease to 48 units daily  Continue Ozempic 0.5 mg weekly on Sundays. Call if you have trouble getting this medication  Continue Farxiga 10 mg daily  Continue metformin 1000 mg twice daily

## 2025-02-02 NOTE — PROGRESS NOTES
Pharmacy Progress Note - Diabetes Management    Assessment / Plan:   Diabetes Management:  - Per ADA guidelines, Pt's POC A1c today (6.4%) is at goal of < 7% .  BP well controlled.   - Available BGM values within fasting target goal <130.   - Decrease Tresiba insulin to 40 units daily   - Continue Ozempic 0.5 mg weekly  - Continue metformin 1000 mg twice daily  - Continue Farxiga 10 mg daily     S/O: Mr. Rick Szymanski Jr is a 49 y.o. male, referred by Olivier Lewis MD, with a PMH of T2DM, HLD, HTN, was seen today for diabetes management follow up.  Patient's last A1c was 9.8% (Oct 2024), 9.1% (Sept 2024), 7.9% (April 2024), 7.3% (Jan 2024, Oct 2023), 7.5% (Aug 2023), 8.2% (April 2023), 9.6% (Dec 2022), 11% (July 2022), 8.3% (April 2022), 8.8% (April 2018), 14.6% (Jan 2016) .     Current anti-hyperglycemic regimen include(s):    - Metformin 500 mg ER - take 1 gm BID   - Farxiga 10 mg daily  - Tresiba 50 units daily . If BG <80, decrease to 48 units daily. --> giving 48 units daily   - Ozempic 0.5 mg weekly Sunday     - H/o Semglee    - ASA 81 mg daily,   - Valsartan/HCTZ 160/25 mg daily, Amlodipine 5 mg daily  - No statin. LDL 89, TG 97 (Oct 2024)    ROS:  Today, Pt endorses:  Signs and symptoms of Hypoglycemia: none  Signs and symptoms of Hyperglycemia: none    Blood Glucose Monitoring (BGM) or CGM:  Brought in glucometer. Does not monitor daily  Date Fasting     123     148     113     176     96     91     90     96     93     97     88   2/2/2025 78     Nutrition/Lifestyle Modifications:  Eating 2 meals/day -- breakfast & dinner ; continues to endorse good appetite suppresion   Dinner last night: small serving of lasagna + green beans   Continues to occasionally have juice -- ~1 cup.   Stays hydrated with water - particularly at work.       The 10-year ASCVD risk score (Ariana MIRAMONTES, et al., 2019) is: 14.6%       Vitals:  Wt Readings from Last 3 Encounters:   02/03/25 112 kg (247 lb)   12/24/24 113.4 kg (250

## 2025-02-03 ENCOUNTER — PHARMACY VISIT (OUTPATIENT)
Age: 50
End: 2025-02-03
Payer: COMMERCIAL

## 2025-02-03 VITALS
HEIGHT: 73 IN | SYSTOLIC BLOOD PRESSURE: 126 MMHG | OXYGEN SATURATION: 97 % | DIASTOLIC BLOOD PRESSURE: 80 MMHG | BODY MASS INDEX: 32.74 KG/M2 | WEIGHT: 247 LBS | HEART RATE: 76 BPM

## 2025-02-03 DIAGNOSIS — Z79.4 TYPE 2 DIABETES MELLITUS WITH RETINOPATHY, WITH LONG-TERM CURRENT USE OF INSULIN, MACULAR EDEMA PRESENCE UNSPECIFIED, UNSPECIFIED LATERALITY, UNSPECIFIED RETINOPATHY SEVERITY (HCC): Primary | ICD-10-CM

## 2025-02-03 DIAGNOSIS — E11.319 TYPE 2 DIABETES MELLITUS WITH RETINOPATHY, WITH LONG-TERM CURRENT USE OF INSULIN, MACULAR EDEMA PRESENCE UNSPECIFIED, UNSPECIFIED LATERALITY, UNSPECIFIED RETINOPATHY SEVERITY (HCC): Primary | ICD-10-CM

## 2025-02-03 DIAGNOSIS — E11.29 TYPE 2 DIABETES MELLITUS WITH OTHER DIABETIC KIDNEY COMPLICATION (HCC): ICD-10-CM

## 2025-02-03 LAB — HBA1C MFR BLD: 6.4 %

## 2025-02-03 PROCEDURE — 83036 HEMOGLOBIN GLYCOSYLATED A1C: CPT | Performed by: PHARMACIST

## 2025-02-03 RX ORDER — INSULIN DEGLUDEC 100 U/ML
40 INJECTION, SOLUTION SUBCUTANEOUS DAILY
Qty: 15 ADJUSTABLE DOSE PRE-FILLED PEN SYRINGE | Refills: 3
Start: 2025-02-03

## 2025-02-03 RX ORDER — SEMAGLUTIDE 0.68 MG/ML
0.5 INJECTION, SOLUTION SUBCUTANEOUS WEEKLY
Qty: 3 ML | Refills: 0 | Status: SHIPPED | COMMUNITY
Start: 2025-02-03

## 2025-02-03 NOTE — PATIENT INSTRUCTIONS
Your A1c today was 6.4%. Your goal is to be below 7%. Keep up the great work!  Decrease Tresiba insulin to 40 units daily   Continue Ozempic 0.5 mg weekly  Continue metformin 1000 mg twice daily  Continue Farxiga 10 mg daily

## 2025-02-17 RX ORDER — METFORMIN HYDROCHLORIDE 500 MG/1
1000 TABLET, EXTENDED RELEASE ORAL 2 TIMES DAILY
Qty: 360 TABLET | Refills: 3 | Status: SHIPPED | OUTPATIENT
Start: 2025-02-17

## 2025-02-21 RX ORDER — AMLODIPINE BESYLATE 5 MG/1
5 TABLET ORAL DAILY
Qty: 90 TABLET | Refills: 3 | Status: SHIPPED | OUTPATIENT
Start: 2025-02-21

## 2025-03-16 NOTE — PROGRESS NOTES
10-year ASCVD risk score (Ariana MIRAMONTES, et al., 2019) is: 12.4%       Vitals:  Wt Readings from Last 3 Encounters:   03/17/25 112.9 kg (249 lb)   02/03/25 112 kg (247 lb)   12/24/24 113.4 kg (250 lb)     BP Readings from Last 3 Encounters:   03/17/25 115/80   02/03/25 126/80   12/24/24 132/84     Pulse Readings from Last 3 Encounters:   03/17/25 86   02/03/25 76   12/24/24 77       Past Medical History:   Diagnosis Date    Diabetes (HCC)      No Known Allergies    Current Outpatient Medications   Medication Sig    amLODIPine (NORVASC) 5 MG tablet TAKE 1 TABLET BY MOUTH EVERY DAY    metFORMIN (GLUCOPHAGE-XR) 500 MG extended release tablet TAKE 2 TABLETS TWICE A DAY    Semaglutide,0.25 or 0.5MG/DOS, (OZEMPIC, 0.25 OR 0.5 MG/DOSE,) 2 MG/3ML SOPN Inject 0.5 mg into the skin once a week    Insulin Degludec (TRESIBA FLEXTOUCH) 100 UNIT/ML SOPN Inject 40 Units into the skin daily    tadalafil (CIALIS) 10 MG tablet TAKE ONE TABLET BY MOUTH DAILY AS NEEDED    valsartan-hydroCHLOROthiazide (DIOVAN-HCT) 160-25 MG per tablet TAKE 1 TABLET DAILY    aspirin 81 MG chewable tablet Take 1 tablet by mouth Every Day    Insulin Pen Needle (BD PEN NEEDLE JATINDER U/F) 32G X 4 MM MISC 1 each by Does not apply route daily Use to give insulin under the skin daily E11.65    blood glucose monitor strips Use to check blood sugar three times daily. E11.65. Freestyle Lite meter    dapagliflozin (FARXIGA) 10 MG tablet Take 1 tablet by mouth every morning     No current facility-administered medications for this visit.       Lab Results   Component Value Date     10/28/2024    K 4.0 10/28/2024     10/28/2024    CO2 27 10/28/2024    BUN 13 10/28/2024    CREATININE 1.24 10/28/2024    GLUCOSE 137 (H) 10/28/2024    CALCIUM 9.6 10/28/2024    BILITOT 0.5 10/28/2024    ALKPHOS 113 10/28/2024    AST 21 10/28/2024    ALT 48 10/28/2024    LABGLOM 71 10/28/2024    GFRAA >60 07/12/2022    AGRATIO 1.2 04/21/2023    GLOB 3.3 10/28/2024       Lab Results

## 2025-03-17 ENCOUNTER — PHARMACY VISIT (OUTPATIENT)
Age: 50
End: 2025-03-17

## 2025-03-17 VITALS
HEART RATE: 86 BPM | DIASTOLIC BLOOD PRESSURE: 80 MMHG | SYSTOLIC BLOOD PRESSURE: 115 MMHG | OXYGEN SATURATION: 100 % | WEIGHT: 249 LBS | BODY MASS INDEX: 33 KG/M2 | HEIGHT: 73 IN

## 2025-03-17 DIAGNOSIS — Z79.4 TYPE 2 DIABETES MELLITUS WITH RETINOPATHY, WITH LONG-TERM CURRENT USE OF INSULIN, MACULAR EDEMA PRESENCE UNSPECIFIED, UNSPECIFIED LATERALITY, UNSPECIFIED RETINOPATHY SEVERITY (HCC): Primary | ICD-10-CM

## 2025-03-17 DIAGNOSIS — E11.319 TYPE 2 DIABETES MELLITUS WITH RETINOPATHY, WITH LONG-TERM CURRENT USE OF INSULIN, MACULAR EDEMA PRESENCE UNSPECIFIED, UNSPECIFIED LATERALITY, UNSPECIFIED RETINOPATHY SEVERITY (HCC): Primary | ICD-10-CM

## 2025-03-17 RX ORDER — DAPAGLIFLOZIN 10 MG/1
10 TABLET, FILM COATED ORAL EVERY MORNING
Qty: 90 TABLET | Refills: 3 | Status: SHIPPED | OUTPATIENT
Start: 2025-03-17

## 2025-03-17 NOTE — PATIENT INSTRUCTIONS
Continue Tresiba 40 units daily until next Sunday. Once you start on Ozempic 1 mg weekly. Decrease insulin dose to 34 units daily.  Increase Ozempic to 1 mg weekly at the next dose. Give two injections of the  0.5 mg until you receive the 1 mg pen.  Continue Farxiga 10 mg daily  Continue metformin 1000 mg twice daily

## 2025-04-15 ENCOUNTER — TELEPHONE (OUTPATIENT)
Age: 50
End: 2025-04-15

## 2025-04-15 NOTE — TELEPHONE ENCOUNTER
Pharmacy Progress Note - Telephone Encounter    S/O: [facility] pharmacy ([phone]) was contacted via an outbound telephone call on behalf of . Rick Szymanski Jr 49 y.o. male, referred by Olivier Lewis MD, to discuss the patient's Ozempic 1 mg prescription today.     - Per pharmacy employee, a prior authorization is needed and was sent several times to fax number [phone], with no response.    A/P:  - Informed pharmacy employee that that was the incorrect fax number. Provided pharmacy with the correct fax number: [phone]. Should be receiving prior authorization momentarily.    There are no discontinued medications.  No orders of the defined types were placed in this encounter.     Thank you,  Uma Huff, PharmD

## 2025-04-15 NOTE — TELEPHONE ENCOUNTER
Pharmacy Progress Note - Telephone Call    Mr. Rick Szymanski Jr 49 y.o. was contacted via an outbound telephone call regarding his previous call today.  A voicemail was left for patient to return my call.     Reviewed and agree with Edwige Huff PharmD's documentation.     Thank you,  Esther Srivastava, PharmD, BCACP, [facility] [place]      For Pharmacy Admin Tracking Only    Program: Medical Group  CPA in place:  Yes  Recommendation Provided To: Patient/Caregiver: 1 via Telephone and Pharmacy: 1  Intervention Detail: Benefit Assistance  Intervention Accepted By: Patient/Caregiver: 1 and Pharmacy: 1  Gap Closed?: Yes   Time Spent (min): 20

## 2025-04-15 NOTE — TELEPHONE ENCOUNTER
Pt states insurance wouldn't cover   Semaglutide, 1 MG/DOSE, (OZEMPIC) 4 MG/3ML SOPN sc injection and would like to go back to original RX.    Confirmed pharmacy on file.     Please call to discuss.

## 2025-04-16 ENCOUNTER — TELEPHONE (OUTPATIENT)
Age: 50
End: 2025-04-16

## 2025-04-16 NOTE — TELEPHONE ENCOUNTER
Pharmacy Progress Note - Medication Access    Contacted [facility] regarding Mr. Rick Szymanski Jr 49 y.o. 's Ozempic 1 mg weekly access.     PA is now approved until April 2026.   Confirmed copay is $25/month. Medication will be ready tomorrow.     Contacted patient to relay updates. PHI verified.  All questions answered at this time.     Thank you for the consult,  Jorge GuadarramaD, BCACP, [facility]ES          For Pharmacy Admin Tracking Only    Program: Medical Group  CPA in place:  Yes  Recommendation Provided To: Patient/Caregiver: 2 via Telephone and Pharmacy: 2  Intervention Detail: Benefit Assistance and Patient Access Assistance/Sample Provided  Intervention Accepted By: Patient/Caregiver: 2  Gap Closed?: Yes   Time Spent (min): 15

## 2025-04-26 NOTE — PROGRESS NOTES
HISTORY OF PRESENT ILLNESS   Rick Szymanski Jr   is a 49 y.o.  male.   f/u DM-2 with microalbuminuria and retinopathy on insulin obesity HTN and  dyslipidemia .  Last A1c 9.8  LDL 89  Saw pharm D-ozempic added, tresiba increased 55 u qd but now only 35 u qd    Fsbs    Not eating as much on ozempic and down about 7 lbs    Waks at work   No cp or sob   Some tingling in feet    Last OV   [facility]--injections to left eye for retinopathy-appt q 8 weeks   last A1c 7.9  Fsbs on farxiga metformin and tresiba 50 u- 146 this am-often around 160 in AM  No cp sob neuropathy in feet except occassional  tingling in feet  Not strict with diet  No BPH symptoms     Works in [employer]  Nonsmoker, etoh weekends only   2 sons  Patient Active Problem List    Diagnosis Date Noted    Obesity (BMI 30-39.9) 10/20/2023    Diabetic retinopathy associated with type 2 diabetes mellitus (HCC) 04/21/2023    HTN (hypertension) 04/21/2023    Microalbuminuria 04/21/2023    Diabetes mellitus (HCC) 09/26/2011     Current Outpatient Medications   Medication Sig Dispense Refill    Semaglutide, 1 MG/DOSE, (OZEMPIC) 4 MG/3ML SOPN sc injection Inject 1 mg into the skin every 7 days 3 mL 1    dapagliflozin (FARXIGA) 10 MG tablet Take 1 tablet by mouth every morning Dispense brand if insurance prefers brand name 90 tablet 3    Semaglutide,0.25 or 0.5MG/DOS, 2 MG/3ML SOPN Inject 1 mg into the skin every 7 days Give two injections of 0.5 mg to total 1 mg. 3 mL 0    amLODIPine (NORVASC) 5 MG tablet TAKE 1 TABLET BY MOUTH EVERY DAY 90 tablet 3    metFORMIN (GLUCOPHAGE-XR) 500 MG extended release tablet TAKE 2 TABLETS TWICE A  tablet 3    Insulin Degludec (TRESIBA FLEXTOUCH) 100 UNIT/ML SOPN Inject 40 Units into the skin daily 15 Adjustable Dose Pre-filled Pen Syringe 3    tadalafil (CIALIS) 10 MG tablet TAKE ONE TABLET BY MOUTH DAILY AS NEEDED 56 tablet 1    valsartan-hydroCHLOROthiazide (DIOVAN-HCT) 160-25 MG per tablet TAKE 1

## 2025-04-28 ENCOUNTER — OFFICE VISIT (OUTPATIENT)
Age: 50
End: 2025-04-28
Payer: COMMERCIAL

## 2025-04-28 VITALS
DIASTOLIC BLOOD PRESSURE: 70 MMHG | HEIGHT: 73 IN | RESPIRATION RATE: 16 BRPM | HEART RATE: 92 BPM | BODY MASS INDEX: 32.29 KG/M2 | TEMPERATURE: 98.4 F | WEIGHT: 243.6 LBS | OXYGEN SATURATION: 98 % | SYSTOLIC BLOOD PRESSURE: 106 MMHG

## 2025-04-28 DIAGNOSIS — R80.9 TYPE 2 DIABETES MELLITUS WITH DIABETIC MICROALBUMINURIA, WITH LONG-TERM CURRENT USE OF INSULIN (HCC): ICD-10-CM

## 2025-04-28 DIAGNOSIS — I10 HYPERTENSION, UNSPECIFIED TYPE: ICD-10-CM

## 2025-04-28 DIAGNOSIS — E11.29 TYPE 2 DIABETES MELLITUS WITH DIABETIC MICROALBUMINURIA, WITH LONG-TERM CURRENT USE OF INSULIN (HCC): ICD-10-CM

## 2025-04-28 DIAGNOSIS — E11.29 TYPE 2 DIABETES MELLITUS WITH DIABETIC MICROALBUMINURIA, WITH LONG-TERM CURRENT USE OF INSULIN (HCC): Primary | ICD-10-CM

## 2025-04-28 DIAGNOSIS — E78.5 DYSLIPIDEMIA: ICD-10-CM

## 2025-04-28 DIAGNOSIS — Z79.4 TYPE 2 DIABETES MELLITUS WITH DIABETIC MICROALBUMINURIA, WITH LONG-TERM CURRENT USE OF INSULIN (HCC): Primary | ICD-10-CM

## 2025-04-28 DIAGNOSIS — R80.9 TYPE 2 DIABETES MELLITUS WITH DIABETIC MICROALBUMINURIA, WITH LONG-TERM CURRENT USE OF INSULIN (HCC): Primary | ICD-10-CM

## 2025-04-28 DIAGNOSIS — Z79.4 TYPE 2 DIABETES MELLITUS WITH DIABETIC MICROALBUMINURIA, WITH LONG-TERM CURRENT USE OF INSULIN (HCC): ICD-10-CM

## 2025-04-28 LAB
ALT SERPL-CCNC: 48 U/L (ref 12–78)
ANION GAP SERPL CALC-SCNC: 3 MMOL/L (ref 2–12)
AST SERPL-CCNC: 28 U/L (ref 15–37)
BUN SERPL-MCNC: 16 MG/DL (ref 6–20)
BUN/CREAT SERPL: 12 (ref 12–20)
CALCIUM SERPL-MCNC: 8.7 MG/DL (ref 8.5–10.1)
CHLORIDE SERPL-SCNC: 106 MMOL/L (ref 97–108)
CO2 SERPL-SCNC: 27 MMOL/L (ref 21–32)
CREAT SERPL-MCNC: 1.32 MG/DL (ref 0.7–1.3)
CREAT UR-MCNC: 162 MG/DL
EST. AVERAGE GLUCOSE BLD GHB EST-MCNC: 140 MG/DL
GLUCOSE SERPL-MCNC: 89 MG/DL (ref 65–100)
HBA1C MFR BLD: 6.5 % (ref 4–5.6)
MICROALBUMIN UR-MCNC: 4.04 MG/DL
MICROALBUMIN/CREAT UR-RTO: 25 MG/G (ref 0–30)
POTASSIUM SERPL-SCNC: 4.2 MMOL/L (ref 3.5–5.1)
SODIUM SERPL-SCNC: 136 MMOL/L (ref 136–145)
SPECIMEN HOLD: NORMAL

## 2025-04-28 PROCEDURE — 3078F DIAST BP <80 MM HG: CPT | Performed by: INTERNAL MEDICINE

## 2025-04-28 PROCEDURE — 3044F HG A1C LEVEL LT 7.0%: CPT | Performed by: INTERNAL MEDICINE

## 2025-04-28 PROCEDURE — 99214 OFFICE O/P EST MOD 30 MIN: CPT | Performed by: INTERNAL MEDICINE

## 2025-04-28 PROCEDURE — 3074F SYST BP LT 130 MM HG: CPT | Performed by: INTERNAL MEDICINE

## 2025-04-28 SDOH — ECONOMIC STABILITY: FOOD INSECURITY: WITHIN THE PAST 12 MONTHS, YOU WORRIED THAT YOUR FOOD WOULD RUN OUT BEFORE YOU GOT MONEY TO BUY MORE.: NEVER TRUE

## 2025-04-28 SDOH — ECONOMIC STABILITY: FOOD INSECURITY: WITHIN THE PAST 12 MONTHS, THE FOOD YOU BOUGHT JUST DIDN'T LAST AND YOU DIDN'T HAVE MONEY TO GET MORE.: NEVER TRUE

## 2025-04-28 ASSESSMENT — PATIENT HEALTH QUESTIONNAIRE - PHQ9
SUM OF ALL RESPONSES TO PHQ QUESTIONS 1-9: 0
SUM OF ALL RESPONSES TO PHQ QUESTIONS 1-9: 0
2. FEELING DOWN, DEPRESSED OR HOPELESS: NOT AT ALL
SUM OF ALL RESPONSES TO PHQ QUESTIONS 1-9: 0
SUM OF ALL RESPONSES TO PHQ QUESTIONS 1-9: 0
1. LITTLE INTEREST OR PLEASURE IN DOING THINGS: NOT AT ALL

## 2025-04-28 NOTE — PROGRESS NOTES
Have you been to the ER, urgent care clinic since your last visit?  Hospitalized since your last visit?   no    Have you seen or consulted any other health care providers outside our system since your last visit?   no      “Have you had a diabetic eye exam?”    2025    No diabetic eye exam on file

## 2025-04-29 ENCOUNTER — RESULTS FOLLOW-UP (OUTPATIENT)
Age: 50
End: 2025-04-29

## 2025-06-01 NOTE — PROGRESS NOTES
Pharmacy Progress Note - Diabetes Management    Assessment / Plan:   Diabetes Management:  - Per ADA guidelines, Pt's A1c is  at goal of < 7%.  BP <130/80. Remains very motivated   - Review hypoglycemia management steps again today. Recommend for patient to bring in glucometer to the next visit.   - Adjust Tresiba to 30 units daily.   - Continue Ozempic 1 mg weekly  - Continue Farxiga 10 mg daily  - Continue metformin 1 gm BID     S/O: Mr. Rick Szymanski Jr is a 49 y.o. male, referred by Olivier Lewis MD, with a PMH of T2DM, HLD, HTN, was seen today for diabetes management follow up.  Patient's last A1c was 6.5% (April 2025), 6.4% (Feb 2025), 9.8% (Oct 2024), 9.1% (Sept 2024), 7.9% (April 2024), 7.3% (Jan 2024, Oct 2023), 7.5% (Aug 2023), 8.2% (April 2023), 9.6% (Dec 2022), 11% (July 2022), 8.3% (April 2022), 8.8% (April 2018), 14.6% (Jan 2016) .     Interim update:   Saw Dr Bob 4/28/25  Ozempic increased to 1 mg weekly . Reports to experiencing indigestion every other day. Tums helps with this.   Otherwise adjustment is manageable for him.     Current anti-hyperglycemic regimen include(s):    - Metformin 500 mg ER - take 1 gm BID   - Farxiga 10 mg daily  - Tresiba 34 units daily   - Ozempic 1 mg weekly Sunday      - H/o Semglee    - ASA 81 mg daily,   - Valsartan/HCTZ 160/25 mg daily, Amlodipine 5 mg daily  - No statin. LDL 89, TG 97 (Oct 2024)    ROS:  Today, Pt endorses:  Signs and symptoms of Hypoglycemia: none  Signs and symptoms of Hyperglycemia: none    Blood Glucose Monitoring (BGM) or CGM:  Forgot his glucometer today.   Reports recent fasting readings 90-110s  PM readings 140s.     Reports he has experienced at least 2 occurrences at work - \"jittery, heart beating fast, sweaty\"   Felt better after he had juice.     Nutrition/Lifestyle Modifications:  Denies changes to nutrition.  Remains very active at work.     Had eye exam on Monday ---reports interval now extended - next f/up in August 2025    The

## 2025-06-05 ENCOUNTER — PHARMACY VISIT (OUTPATIENT)
Age: 50
End: 2025-06-05

## 2025-06-05 VITALS
WEIGHT: 241 LBS | HEIGHT: 73 IN | OXYGEN SATURATION: 98 % | DIASTOLIC BLOOD PRESSURE: 81 MMHG | BODY MASS INDEX: 31.94 KG/M2 | HEART RATE: 66 BPM | SYSTOLIC BLOOD PRESSURE: 127 MMHG

## 2025-06-05 DIAGNOSIS — R80.9 TYPE 2 DIABETES MELLITUS WITH DIABETIC MICROALBUMINURIA, WITH LONG-TERM CURRENT USE OF INSULIN (HCC): Primary | ICD-10-CM

## 2025-06-05 DIAGNOSIS — E11.319 TYPE 2 DIABETES MELLITUS WITH RETINOPATHY, WITH LONG-TERM CURRENT USE OF INSULIN, MACULAR EDEMA PRESENCE UNSPECIFIED, UNSPECIFIED LATERALITY, UNSPECIFIED RETINOPATHY SEVERITY (HCC): ICD-10-CM

## 2025-06-05 DIAGNOSIS — E11.29 TYPE 2 DIABETES MELLITUS WITH DIABETIC MICROALBUMINURIA, WITH LONG-TERM CURRENT USE OF INSULIN (HCC): Primary | ICD-10-CM

## 2025-06-05 DIAGNOSIS — Z79.4 TYPE 2 DIABETES MELLITUS WITH DIABETIC MICROALBUMINURIA, WITH LONG-TERM CURRENT USE OF INSULIN (HCC): Primary | ICD-10-CM

## 2025-06-05 DIAGNOSIS — Z79.4 TYPE 2 DIABETES MELLITUS WITH RETINOPATHY, WITH LONG-TERM CURRENT USE OF INSULIN, MACULAR EDEMA PRESENCE UNSPECIFIED, UNSPECIFIED LATERALITY, UNSPECIFIED RETINOPATHY SEVERITY (HCC): ICD-10-CM

## 2025-06-05 RX ORDER — INSULIN DEGLUDEC 100 U/ML
30 INJECTION, SOLUTION SUBCUTANEOUS DAILY
Qty: 15 ADJUSTABLE DOSE PRE-FILLED PEN SYRINGE | Refills: 0
Start: 2025-06-05

## 2025-06-05 NOTE — PATIENT INSTRUCTIONS
Reduce Tresiba insulin to 30 units daily.   Contact [facility] about your Ozempic 1 mg weekly refill.  Continue Farxiga 10 mg daily  Continue metformin 1000 mg twice daily  Bring meter to the next visit    Your blood sugar goals:  - Fasting (first thing in the morning)  blood sugar: 80 - 130   - 1 to 2 hours after a meal: less than 180     When you experience symptoms of low blood sugar (example: less than 70):  - Confirm low reading by checking your blood sugar.   - Then treat with 15 grams of carbohydrates (one-half cup of juice or regular soda, or 4-5 glucose tablets).  - Wait 15 minutes to recheck blood sugar.   - Then eat a protein containing meal/snack to prevent another low blood sugar episode. (example: peanut butter + crackers)

## 2025-06-11 ENCOUNTER — TELEPHONE (OUTPATIENT)
Age: 50
End: 2025-06-11

## 2025-06-11 NOTE — TELEPHONE ENCOUNTER
Pt states that we need to call insurance to find out why it is so expensive.  The medication is Ozempic 1 mg pen.    He states that Maliha spoke to them the last time and got the price lowered.      Please call pt with what you can do for him

## 2025-06-11 NOTE — TELEPHONE ENCOUNTER
Pharmacy Progress Note - Medication Access    Contacted [facility] regarding Mr. Rick Szymanski Jr 49 y.o. 's Ozempic access.    Current copay quoted $900/fill.   [phone]    [id number]  Help: [phone]    Per [employer] patient is enrolled in a 90 days program in which plan requires for all medications to be filled 90ds at a time. Need to use an in network pharmacy or mail order pharmacy. Otherwise, patient will be penalized.   [facility] in network.  Patient will need to contact [facility] Customer service: [phone] -- if [facility] is unable to fill 90 ds.  Will need to have this process with [facility] mail order.   Quoted estimate: $60/ 3months     Old insurance:  [phone]  ID: [id number]      Contacted patient to share updates.  Remind patient to share an updated insurance card at his future visit. All questions answered at this time.           Thank you for the consult,  Esther Srivastava, PharmD, BCACP, [facility]      Medications Discontinued During This Encounter   Medication Reason    Semaglutide, 1 MG/DOSE, (OZEMPIC) 4 MG/3ML SOPN sc injection Formulary change     Orders Placed This Encounter    Semaglutide, 1 MG/DOSE, (OZEMPIC) 4 MG/3ML SOPN sc injection     Sig: Inject 1 mg into the skin every 7 days     Dispense:  9 mL     Refill:  1     Insurance prefers 90 day supply.         For Pharmacy Admin Tracking Only    Program: Medical Group  CPA in place:  Yes  Recommendation Provided To: Patient/Caregiver: 4 via Telephone, Pharmacy: 4, and Other: 4  Intervention Detail: Benefit Assistance, Discontinued Rx: 1, reason: Cost/Formulary Change, New Rx: 1, reason: Cost/Formulary Change, and Patient Access Assistance/Sample Provided  Intervention Accepted By: Patient/Caregiver: 4, Pharmacy: 4, and Other: 4  Gap Closed?: Yes   Time Spent (min):  65